# Patient Record
Sex: FEMALE | Race: WHITE | NOT HISPANIC OR LATINO | Employment: OTHER | URBAN - NONMETROPOLITAN AREA
[De-identification: names, ages, dates, MRNs, and addresses within clinical notes are randomized per-mention and may not be internally consistent; named-entity substitution may affect disease eponyms.]

---

## 2022-09-01 ENCOUNTER — OFFICE VISIT (OUTPATIENT)
Dept: OTOLARYNGOLOGY | Facility: CLINIC | Age: 82
End: 2022-09-01

## 2022-09-01 VITALS
RESPIRATION RATE: 16 BRPM | SYSTOLIC BLOOD PRESSURE: 147 MMHG | TEMPERATURE: 98.4 F | BODY MASS INDEX: 26.2 KG/M2 | DIASTOLIC BLOOD PRESSURE: 82 MMHG | HEIGHT: 66 IN | HEART RATE: 80 BPM | WEIGHT: 163 LBS

## 2022-09-01 DIAGNOSIS — H91.93 BILATERAL HEARING LOSS, UNSPECIFIED HEARING LOSS TYPE: ICD-10-CM

## 2022-09-01 DIAGNOSIS — C44.212: Primary | ICD-10-CM

## 2022-09-01 PROCEDURE — 99203 OFFICE O/P NEW LOW 30 MIN: CPT | Performed by: NURSE PRACTITIONER

## 2022-09-01 RX ORDER — ERGOCALCIFEROL (VITAMIN D2) 10 MCG
400 TABLET ORAL DAILY
COMMUNITY

## 2022-09-01 RX ORDER — MULTIVIT WITH MINERALS/LUTEIN
1000 TABLET ORAL DAILY
COMMUNITY

## 2022-09-16 ENCOUNTER — APPOINTMENT (OUTPATIENT)
Dept: CT IMAGING | Facility: HOSPITAL | Age: 82
End: 2022-09-16

## 2022-09-23 ENCOUNTER — HOSPITAL ENCOUNTER (OUTPATIENT)
Dept: CT IMAGING | Facility: HOSPITAL | Age: 82
Discharge: HOME | End: 2022-09-23
Admitting: NURSE PRACTITIONER

## 2022-09-23 PROCEDURE — 25010000002 IOPAMIDOL 61 % SOLUTION: Performed by: NURSE PRACTITIONER

## 2022-09-23 PROCEDURE — 70482 CT ORBIT/EAR/FOSSA W/O&W/DYE: CPT

## 2022-09-23 RX ADMIN — IOPAMIDOL 100 ML: 612 INJECTION, SOLUTION INTRAVENOUS at 14:25

## 2022-09-26 NOTE — PROGRESS NOTES
YOB: 1940  Location: [facility]  Location Address: [address] [place] [address],  3, [address] [place] [address]  Location Phone: [phone]    Chief Complaint   Patient presents with   • Basal Cell Carcinoma     Pt had CT scan done       History of Present Illness  Rosanna Chaparro is a 82 y.o. female.  Rosanna Chaparro is here for follow up of ENT complaints. The patient has had problems with a lesion to the right ear and hearing loss.  The patient has had mild to moderate symptoms. The symptoms have been present for the last year. She had been using vitamin E oil to the ear.    CT Temporal Bones With & Without Contrast (2022 14:25)    Study Result    Narrative & Impression   CT TEMPORAL BONES W WO CONTRAST- 2022 2:04 PM CDT     Indication:  Hearing loss       DOSE LENGTH PRODUCT: 684 mGy cm. Automated exposure control was also  utilized to decrease patient radiation dose.     Technique: Axial CT of the skull base focusing on the temporal bones  with and without IV contrast. Multiplanar reformats are also provided  for review.     Comparison: None     Findings:      Right: Prominent skin thickening and enhancement at the acoustic meatus  which appears to extend anteriorly and inferiorly into the intertragic  fossa. The external canal is completely opacified with debris. No  destructive bony change identified along the margins of the external  canal. Tympanic membrane appears intact. Ossicular chain is intact. Oval  and round window niches are clear. Middle ear cavity is clear including  the Prussak's space. Mastoid antrum and mastoid air cells are clear.  Tegmen is intact. The cochlea and vestibular aqueducts are unremarkable.  Nearly dehiscent superior semicircular canal. Facial nerve canal is  unremarkable.     Left: Debris in the external canal. Tympanic membrane appears grossly  intact. Ossicular chain is intact. Oval and round window niches are  clear. Middle ear cavity is clear  including the Prussak's space. Mastoid  antrum and mastoid air cells are clear. Tegmen is intact. The cochlea  and vestibular aqueducts are unremarkable. Nearly dehiscent superior  semicircular canal. Facial nerve canal is unremarkable.     Visualized intracranial contents are unremarkable. Bilateral high riding  jugular bulbs.     IMPRESSION:  Impression:      1. Abnormal skin thickening and enhancement at the right acoustic  meatus. Recommend clinical correlation/visual inspection in this area.  The right external canal is near completely opacified with soft tissue  debris. I do not see destructive bony change along the external canal.  2. Middle ear cavities and mastoids are clear. Ossicular chains are  intact.  3. Nearly dehiscent superior semicircular canals, bilateral.  This report was finalized on 09/25/2022 18:56 by Dr Alo Reyes, .            History reviewed. No pertinent past medical history.    History reviewed. No pertinent surgical history.    Outpatient Medications Marked as Taking for the 9/27/22 encounter (Office Visit) with Yahir Cao MD   Medication Sig Dispense Refill   • ascorbic acid (VITAMIN C) 1000 MG tablet Take  by mouth.     • CALCIUM PO Take  by mouth.     • CRANBERRY PO Take  by mouth.     • cyanocobalamin (VITAMIN B-12) 1000 MCG tablet Take  by mouth.     • Multiple Vitamins-Minerals (EMERGEN-C IMMUNE PLUS PO) Take  by mouth.     • Vitamin D, Cholecalciferol, (CHOLECALCIFEROL) 10 MCG (400 UNIT) tablet Take 400 Units by mouth Daily.     • vitamin E 100 UNIT capsule Take 100 Units by mouth Daily.         Hydrocodone    History reviewed. No pertinent family history.    Social History     Socioeconomic History   • Marital status:    Tobacco Use   • Smoking status: Never Smoker   • Smokeless tobacco: Never Used   Vaping Use   • Vaping Use: Never used   Substance and Sexual Activity   • Alcohol use: Never   • Drug use: Never       Review of Systems   Constitutional:  Negative.    HENT: Positive for hearing loss.         Admits right ear lesion   Respiratory: Negative.    Cardiovascular: Negative.    Gastrointestinal: Negative.        Vitals:    09/27/22 1352   BP: 167/78   Pulse: 75   Resp: 20   Temp: 97.7 °F (36.5 °C)       Body mass index is 26.63 kg/m².    Objective     Physical Exam  Vitals reviewed.   Constitutional:       Appearance: Normal appearance.   HENT:      Head: Normocephalic and atraumatic.      Right Ear: Decreased hearing noted.      Left Ear: Tympanic membrane, ear canal and external ear normal. Decreased hearing noted.      Ears:        Nose: Nose normal.      Mouth/Throat:      Lips: Pink.      Mouth: Mucous membranes are moist.      Pharynx: Oropharynx is clear. Uvula midline.   Musculoskeletal:      Cervical back: Full passive range of motion without pain.   Neurological:      Mental Status: She is alert.   Psychiatric:         Behavior: Behavior is cooperative.         Assessment & Plan   Diagnoses and all orders for this visit:    1. Basal cell carcinoma (BCC) of jamari of right ear (Primary)  -     Ambulatory Referral to Oncology    2. Bilateral hearing loss, unspecified hearing loss type      * Surgery not found *  Orders Placed This Encounter   Procedures   • Ambulatory Referral to Oncology     Referral Priority:   Routine     Referred to Provider:   Reynaldo Mock MD     Requested Specialty:   Oncology     Number of Visits Requested:   1     Discussed surgical removal options vs medical management with patient. She would like to proceed with referral to Dr. Ha for treatment with Eriveage.    Dr. Cao examined and discussed care with patient and agrees with treatment plan.       Return in about 6 months (around 3/27/2023) for Recheck.       Patient Instructions   Discussed surgical removal options vs medical management with patient. She would like to proceed with referral to Dr. Mock for treatment with Eriveage    CONTACT  INFORMATION:  The main office phone number is [phone]. For emergencies after hours and on weekends, this number will convert over to our answering service and the on call provider will answer. Please try to keep non emergent phone calls/ questions to office hours 9am-5pm Monday through Friday.      [employer]  As an alternative, you can sign up and use the Epic MyChart system for more direct and quicker access for non emergent questions/ problems.  [facility] allows you to send messages to your doctor, view your test results, renew your prescriptions, schedule appointments, and more. To sign up, go to [employer] and click on the Sign Up Now link in the New User? box. Enter your [employer] Activation Code exactly as it appears below along with the last four digits of your Social Security Number and your Date of Birth () to complete the sign-up process. If you do not sign up before the expiration date, you must request a new code.     [employer] Activation Code: Activation code not generated  Current [employer] Status: Active     If you have questions, you can email [email or web address] or call [phone] to talk to our [employer] staff. Remember, [employer] is NOT to be used for urgent needs. For medical emergencies, dial 911.     IF YOU SMOKE OR USE TOBACCO PLEASE READ THE FOLLOWING:  Why is smoking bad for me?  Smoking increases the risk of heart disease, lung disease, vascular disease, stroke, and cancer. If you smoke, STOP!        IF YOU SMOKE OR USE TOBACCO PLEASE READ THE FOLLOWING:  Why is smoking bad for me?  Smoking increases the risk of heart disease, lung disease, vascular disease, stroke, and cancer. If you smoke, STOP!     For more information:  Quit Now [place]  -QUIT-NOW  https://[email or web address].quitlogix.org/en-US/

## 2022-09-27 ENCOUNTER — OFFICE VISIT (OUTPATIENT)
Dept: OTOLARYNGOLOGY | Facility: CLINIC | Age: 82
End: 2022-09-27

## 2022-09-27 VITALS
RESPIRATION RATE: 20 BRPM | TEMPERATURE: 97.7 F | DIASTOLIC BLOOD PRESSURE: 78 MMHG | HEART RATE: 75 BPM | HEIGHT: 66 IN | WEIGHT: 165 LBS | BODY MASS INDEX: 26.52 KG/M2 | SYSTOLIC BLOOD PRESSURE: 167 MMHG

## 2022-09-27 DIAGNOSIS — H91.93 BILATERAL HEARING LOSS, UNSPECIFIED HEARING LOSS TYPE: ICD-10-CM

## 2022-09-27 DIAGNOSIS — C44.212: Primary | ICD-10-CM

## 2022-09-27 PROCEDURE — 99213 OFFICE O/P EST LOW 20 MIN: CPT | Performed by: NURSE PRACTITIONER

## 2022-09-27 NOTE — PATIENT INSTRUCTIONS
Discussed surgical removal options vs medical management with patient. She would like to proceed with referral to Dr. Mock for treatment with Eriveage    CONTACT INFORMATION:  The main office phone number is [phone]. For emergencies after hours and on weekends, this number will convert over to our answering service and the on call provider will answer. Please try to keep non emergent phone calls/ questions to office hours 9am-5pm Monday through Friday.      [employer]  As an alternative, you can sign up and use the Epic MyChart system for more direct and quicker access for non emergent questions/ problems.  [facility] [employer] allows you to send messages to your doctor, view your test results, renew your prescriptions, schedule appointments, and more. To sign up, go to [employer] and click on the Sign Up Now link in the New User? box. Enter your [employer] Activation Code exactly as it appears below along with the last four digits of your Social Security Number and your Date of Birth () to complete the sign-up process. If you do not sign up before the expiration date, you must request a new code.     [employer] Activation Code: Activation code not generated  Current [employer] Status: Active     If you have questions, you can email [email or web address] or call [phone] to talk to our [employer] staff. Remember, [employer] is NOT to be used for urgent needs. For medical emergencies, dial 911.     IF YOU SMOKE OR USE TOBACCO PLEASE READ THE FOLLOWING:  Why is smoking bad for me?  Smoking increases the risk of heart disease, lung disease, vascular disease, stroke, and cancer. If you smoke, STOP!        IF YOU SMOKE OR USE TOBACCO PLEASE READ THE FOLLOWING:  Why is smoking bad for me?  Smoking increases the risk of heart disease, lung disease, vascular disease, stroke, and cancer. If you smoke, STOP!     For more information:  Quit Now  [place]  1-800-QUIT-NOW  https://[place].quitlogix.org/en-US/

## 2022-10-04 ENCOUNTER — TELEPHONE (OUTPATIENT)
Dept: OTOLARYNGOLOGY | Facility: CLINIC | Age: 82
End: 2022-10-04

## 2022-11-03 ENCOUNTER — PROCEDURE VISIT (OUTPATIENT)
Dept: OTOLARYNGOLOGY | Facility: CLINIC | Age: 82
End: 2022-11-03

## 2022-11-03 DIAGNOSIS — D48.5 NEOPLASM OF UNCERTAIN BEHAVIOR OF EAR: Primary | ICD-10-CM

## 2022-11-03 PROCEDURE — 11104 PUNCH BX SKIN SINGLE LESION: CPT | Performed by: OTOLARYNGOLOGY

## 2022-11-03 NOTE — PROGRESS NOTES
PROCEDURE NOTE    Rosanna Chaparro    DATE OF PROCEDURE: 11/03/2022    PROCEDURE:   Punch Biopsy    PREPROCEDURE DIAGNOSIS:   Neoplasm of uncertain origin    POSTPROCEDURE DIAGNOSIS:  SAME      ANESTHESIA:   Injected 1% Lidocaine with 1:100,000 parts epinephrine solution -2 cc    PROCEDURE DESCRIPTION:    Following injection of local anesthesia, 3 mm punch biopsy was performed of the lesion of the right jamari cavum. The epidermis was re-approximated with interrupted 5-0 nylon.  The specimen was submitted for permanent pathologic examination.  The patient tolerated the procedure well with no complications.

## 2022-11-14 ENCOUNTER — OFFICE VISIT (OUTPATIENT)
Dept: OTOLARYNGOLOGY | Facility: CLINIC | Age: 82
End: 2022-11-14

## 2022-11-14 DIAGNOSIS — C44.212: Primary | ICD-10-CM

## 2022-11-14 PROCEDURE — 99024 POSTOP FOLLOW-UP VISIT: CPT | Performed by: OTOLARYNGOLOGY

## 2022-11-14 NOTE — PROGRESS NOTES
Procedure   Suture Removal    Date/Time: 11/14/2022 9:45 AM  Performed by: Ally Andrea RN  Authorized by: Yahir Cao MD   Consent: Verbal consent obtained.  Consent given by: patient  Patient identity confirmed: verbally with patient  Body area: head/neck  Location details: right ear  Comments: Patient presents for suture removal. The incision area is well-approximated with no redness or edema noted. Path discussed.

## 2022-11-21 NOTE — PROGRESS NOTES
[facility] HEMATOLOGY & ONCOLOGY [place] [id number] [facility] [address]  [facility] [address] [phone]    Patient Name: Rosanna Chaparro  Encounter Date: 11/28/2022  YOB: 1940  Patient Number: [id number]      REASON FOR CONSULTATION:   Basal cell carcinoma jamari right ear    HISTORY OF PRESENT ILLNESS: Rosanna Chaparro is an 82-year-old female whose history includes diabetes, and was previously followed by Dr. Beth Richards, [facility] for chronic thrombocytopenia.  Question ITP vs MDS.  Last seen in office by Dr. Richards, 07/08/2017.  At that time platelets have been stable for the past few years without active bleeding.  Platelet count was 23,000,.  To a baseline of 30,000.  No aggressive treatments were pursued.  Previous bone marrow biopsy, 11/05/2012 showed hypercellular marrow showing maturing trilineage hematopoiesis with mild dyserythropoiesis and mild megakaryocytic atypia.  No increase in blasts.  Plan: If platelet continues to worsen drops to 10,000-20,000 or evidence of active bleeding then platelet transfusion can be tried and if this does not work then immunosuppressant such as prednisone will be next treatment option.    --07/29/2022-seen by PCP, Dr. Marvin Vasques.  Patient referred to ENT after right ear exam showed stenotic ear canal with a vestibule showing scaly plaques/mass/neoplasm?    --09/01/2022- seen for ENT by Dr. Cao.  Right ear scabby and draining puslike substance for the past 1 year.  Patient stated that she had a lesion to her right ear for the past year that has progressively gotten larger, and had blistered few days prior before starting to drain.    --09/23/2022- CT temporal bones-1. Abnormal skin thickening and enhancement at the right acoustic meatus. Recommend clinical correlation/visual inspection in this area. The right external canal is near completely opacified with soft tissue  "debris. I do not see destructive bony change along the external canal. 2. Middle ear cavities and mastoids are clear. Ossicular chains are intact.3. Nearly dehiscent superior semicircular canals, bilateral.    -- 11/03/2022- 3 mm punch biopsy lesion from jamari right ear.  Microscopic diagnosis: Basal cell carcinoma-surrounded by scar tissue (shave specimen and superficial skin)    I have reviewed the HPI and verified with the patient the accuracy of it. No changes to interval history since the information was documented. Reynaldo Mock MD 11/28/22       PAST MEDICAL HISTORY:  ALLERGIES:  Allergies   Allergen Reactions   • Hydrocodone Other (See Comments)     Made her \"crazy\"     CURRENT MEDICATIONS:  Outpatient Encounter Medications as of 11/28/2022   Medication Sig Dispense Refill   • ascorbic acid (VITAMIN C) 1000 MG tablet Take  by mouth.     • CALCIUM PO Take  by mouth.     • CRANBERRY PO Take  by mouth.     • cyanocobalamin (VITAMIN B-12) 1000 MCG tablet Take  by mouth.     • Multiple Vitamins-Minerals (EMERGEN-C IMMUNE PLUS PO) Take  by mouth.     • Vitamin D, Cholecalciferol, (CHOLECALCIFEROL) 10 MCG (400 UNIT) tablet Take 400 Units by mouth Daily.     • vitamin E 100 UNIT capsule Take 100 Units by mouth Daily.       No facility-administered encounter medications on file as of 11/28/2022.     Adult illnesses:  Diabetes mellitus  Chronic thrombocytopenia (ITP+/- MDS)  Former smoker  Basal cell carcinoma right ear    Past surgeries:  Laparoscopic inguinal hernia repair  Appendectomy  Cholecystectomy, 2000  Renal lithotripsy x3 for renal stones, most recently 03/28/2018  Orthopedic surgery: Left shoulder fracture repair, 2018 with blood transfusion  Biopsy right ear jamari, 11/3/2022    ADULT ILLNESSES:  Patient Active Problem List   Diagnosis Code   • Neoplasm of uncertain behavior of ear D48.5   • Basal cell carcinoma C44.91     SURGERIES:  No past surgical history on file.  HEALTH MAINTENANCE " "ITEMS:  Health Maintenance Due   Topic Date Due   • DXA SCAN  Never done   • COVID-19 Vaccine (1) Never done   • TDAP/TD VACCINES (1 - Tdap) Never done   • ZOSTER VACCINE (1 of 2) Never done   • Pneumococcal Vaccine 65+ (1 - PCV) Never done   • INFLUENZA VACCINE  Never done   • ANNUAL WELLNESS VISIT  Never done       <no information>  Last Completed Colonoscopy     This patient has no relevant Health Maintenance data.          There is no immunization history on file for this patient.  Last Completed Mammogram     This patient has no relevant Health Maintenance data.            FAMILY HISTORY:  No family history on file.  SOCIAL HISTORY:  Social History     Socioeconomic History   • Marital status:    Tobacco Use   • Smoking status: Never   • Smokeless tobacco: Never   Vaping Use   • Vaping Use: Never used   Substance and Sexual Activity   • Alcohol use: Never   • Drug use: Never       REVIEW OF SYSTEMS:  Review of Systems   Constitutional: Negative.  Negative for fatigue.        Manages her personal ADLs, house chores, but does not run errands on her own and has not been driving. \"I could if my  would let me.\" Is up and about \"most all the time.\" Has a rolling walker at home that she hardly uses.  \"I just have it in case I need to walk far.\"   HENT: Positive for hearing loss (Non-healing lesion in the right ear for over a year.  \"The spot in my ear is starting to blocking the way\").    Eyes: Negative.    Respiratory: Negative.    Cardiovascular: Negative.    Gastrointestinal: Negative.    Endocrine: Negative.    Genitourinary: Negative.    Musculoskeletal: Negative for arthralgias.   Skin: Positive for skin lesions (Right ear canal -non-healing skin lesion).   Allergic/Immunologic: Negative.    Neurological: Negative.    Hematological: Negative.    Psychiatric/Behavioral: Negative.        Resp 18   Ht 167.6 cm (65.98\")   Wt 76.1 kg (167 lb 11.2 oz)   BMI 27.08 kg/m²  Body surface area is 1.86 " meters squared.  There were no vitals filed for this visit.    Physical Exam:  Physical Exam  Vitals reviewed.   Constitutional:       Comments: Pleasant, cooperative, modestly kept, heavy-set elderly female.  Brought in by wheelchair but is otherwise ambulatory.  ECOG 0-1.  She is accompanied by her granddaughter, Keturah Bonner   HENT:      Head: Normocephalic and atraumatic.      Ears:      Comments: stenotic ear canal with acoustic meatus demonstrating small heaps of scaly scabs both anteriorly and posteriorly.  Currently not bleeding nor draining.     Mouth/Throat:      Comments: Is wearing a surgical mask today  Eyes:      General: No scleral icterus.     Extraocular Movements: Extraocular movements intact.      Conjunctiva/sclera: Conjunctivae normal.      Pupils: Pupils are equal, round, and reactive to light.   Cardiovascular:      Rate and Rhythm: Normal rate.   Pulmonary:      Effort: Pulmonary effort is normal.   Abdominal:      Palpations: Abdomen is soft.      Tenderness: There is no abdominal tenderness.   Musculoskeletal:         General: Normal range of motion.      Cervical back: Normal range of motion.   Lymphadenopathy:      Head:      Right side of head: No submental, submandibular, preauricular, posterior auricular or occipital adenopathy.      Left side of head: No submental, submandibular, preauricular, posterior auricular or occipital adenopathy.      Cervical: No cervical adenopathy.      Right cervical: No superficial, deep or posterior cervical adenopathy.     Left cervical: No superficial, deep or posterior cervical adenopathy.      Upper Body:      Right upper body: No supraclavicular or axillary adenopathy.      Left upper body: No supraclavicular or axillary adenopathy.   Skin:     Findings: Lesion present.   Neurological:      General: No focal deficit present.      Mental Status: She is alert and oriented to person, place, and time.   Psychiatric:         Mood and Affect: Mood normal.          Thought Content: Thought content normal.         Assessment:  1.   Basal cell carcinoma of the right ear acoustic meatus  -- High risk of recurrence (lesion on head and neck).  Not felt to be excisional candidate due to locally advanced lesion.  -- Up-to-date algorithm for nonsurgical candidates recommends primary radiation therapy (preferred) or hedgehog pathway inhibitor (preferred) or immune checkpoint inhibitor (cemiplimab).  Clinical follow-up for current every 6-12 months.    2.   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 Beth Richards, [facility] for chronic thrombocytopenia.  Question ITP vs MDS.  Last seen in office by Dr. Richards, 07/08/2017.  At that time platelets have been stable for the past few years without active bleeding.  Platelet count was 23,000,.  To a baseline of 30,000.  No aggressive treatments were pursued.  Previous bone marrow biopsy, 11/05/2012 showed hypercellular marrow showing maturing trilineage hematopoiesis with mild dyserythropoiesis and mild megakaryocytic atypia.  No increase in blasts.      3.   Diabetes mellitus.  4.   Advanced age    Plan:  1.   Apprised of the available diagnostic information.  2.   Draw baseline CBC with differential, iron, iron saturation, ferritin, B12, folate, CMP  3.   Refer to Dr. Taylor Re: Assess for definitive radiation  4.   Teaching sheets for Erivedge (vismodegib).  Potential toxicities discussed (to include but not limited to: Liver injury, azotemia, hyponatremia, hypokalemia, Andres-Gulshan syndrome, toxic epidermal necrolysis, muscle spasms, alopecia, dysgeusia, weight loss, fatigue, nausea, amenorrhea, diarrhea, decreased appetite, constipation, arthralgia, vomiting, loss of taste, elevated CK, azotemia.  5.   Schedule CT head, soft tissues of the neck, chest, abdomen/pelvis with p.o. and IV contrast.    6.   Continue management per PCP and other specialists  7.   Return to office in 4  weeks.  Further management recommendations pending.    I spent 68 minutes caring for  Rosanna on this date of service. This time includes time spent by me in the following activities: preparing for the visit, reviewing tests, performing a medically appropriate examination and/or evaluation, counseling and educating the patient/family/caregiver, ordering medications, tests, or procedures and documenting information in the medical record.

## 2022-11-28 ENCOUNTER — LAB (OUTPATIENT)
Dept: LAB | Facility: HOSPITAL | Age: 82
End: 2022-11-28

## 2022-11-28 ENCOUNTER — CONSULT (OUTPATIENT)
Dept: ONCOLOGY | Facility: CLINIC | Age: 82
End: 2022-11-28

## 2022-11-28 VITALS
DIASTOLIC BLOOD PRESSURE: 72 MMHG | WEIGHT: 167.7 LBS | RESPIRATION RATE: 18 BRPM | HEART RATE: 81 BPM | HEIGHT: 66 IN | BODY MASS INDEX: 26.95 KG/M2 | SYSTOLIC BLOOD PRESSURE: 118 MMHG | OXYGEN SATURATION: 98 % | TEMPERATURE: 98.5 F

## 2022-11-28 DIAGNOSIS — C44.212 BASAL CELL CARCINOMA (BCC) OF SKIN OF RIGHT EAR: ICD-10-CM

## 2022-11-28 DIAGNOSIS — C44.212 BASAL CELL CARCINOMA (BCC) OF SKIN OF RIGHT EAR: Primary | ICD-10-CM

## 2022-11-28 PROBLEM — C44.91 BASAL CELL CARCINOMA: Status: ACTIVE | Noted: 2022-11-28

## 2022-11-28 LAB
ALBUMIN SERPL-MCNC: 4.4 G/DL (ref 3.5–5.2)
ALBUMIN/GLOB SERPL: 1.5 G/DL
ALP SERPL-CCNC: 56 U/L (ref 39–117)
ALT SERPL W P-5'-P-CCNC: 13 U/L (ref 1–33)
ANION GAP SERPL CALCULATED.3IONS-SCNC: 11 MMOL/L (ref 5–15)
AST SERPL-CCNC: 20 U/L (ref 1–32)
BILIRUB SERPL-MCNC: 0.5 MG/DL (ref 0–1.2)
BUN SERPL-MCNC: 21 MG/DL (ref 8–23)
BUN/CREAT SERPL: 28.8 (ref 7–25)
CALCIUM SPEC-SCNC: 9.4 MG/DL (ref 8.6–10.5)
CHLORIDE SERPL-SCNC: 104 MMOL/L (ref 98–107)
CO2 SERPL-SCNC: 27 MMOL/L (ref 22–29)
CREAT SERPL-MCNC: 0.73 MG/DL (ref 0.57–1)
DEPRECATED RDW RBC AUTO: 45.9 FL (ref 37–54)
EGFRCR SERPLBLD CKD-EPI 2021: 82.2 ML/MIN/1.73
ERYTHROCYTE [DISTWIDTH] IN BLOOD BY AUTOMATED COUNT: 13.8 % (ref 12.3–15.4)
FERRITIN SERPL-MCNC: 65.61 NG/ML (ref 13–150)
GIANT PLATELETS: NORMAL
GLOBULIN UR ELPH-MCNC: 2.9 GM/DL
GLUCOSE SERPL-MCNC: 92 MG/DL (ref 65–99)
HCT VFR BLD AUTO: 38.8 % (ref 34–46.6)
HGB BLD-MCNC: 11.9 G/DL (ref 12–15.9)
IRON 24H UR-MRATE: 70 MCG/DL (ref 37–145)
IRON SATN MFR SERPL: 20 % (ref 20–50)
MCH RBC QN AUTO: 27.9 PG (ref 26.6–33)
MCHC RBC AUTO-ENTMCNC: 30.7 G/DL (ref 31.5–35.7)
MCV RBC AUTO: 90.9 FL (ref 79–97)
PLATELET # BLD AUTO: 48 10*3/MM3 (ref 140–450)
PMV BLD AUTO: 11.8 FL (ref 6–12)
POTASSIUM SERPL-SCNC: 4.4 MMOL/L (ref 3.5–5.2)
PROT SERPL-MCNC: 7.3 G/DL (ref 6–8.5)
RBC # BLD AUTO: 4.27 10*6/MM3 (ref 3.77–5.28)
SMALL PLATELETS BLD QL SMEAR: NORMAL
SODIUM SERPL-SCNC: 142 MMOL/L (ref 136–145)
TIBC SERPL-MCNC: 344 MCG/DL (ref 298–536)
TRANSFERRIN SERPL-MCNC: 231 MG/DL (ref 200–360)
WBC NRBC COR # BLD: 8.84 10*3/MM3 (ref 3.4–10.8)

## 2022-11-28 PROCEDURE — 99205 OFFICE O/P NEW HI 60 MIN: CPT | Performed by: INTERNAL MEDICINE

## 2022-11-28 PROCEDURE — 83540 ASSAY OF IRON: CPT

## 2022-11-28 PROCEDURE — 82746 ASSAY OF FOLIC ACID SERUM: CPT

## 2022-11-28 PROCEDURE — 84466 ASSAY OF TRANSFERRIN: CPT

## 2022-11-28 PROCEDURE — 82607 VITAMIN B-12: CPT

## 2022-11-28 PROCEDURE — 80053 COMPREHEN METABOLIC PANEL: CPT

## 2022-11-28 PROCEDURE — 36415 COLL VENOUS BLD VENIPUNCTURE: CPT

## 2022-11-28 PROCEDURE — 82728 ASSAY OF FERRITIN: CPT

## 2022-11-28 PROCEDURE — 85007 BL SMEAR W/DIFF WBC COUNT: CPT

## 2022-11-28 PROCEDURE — 85025 COMPLETE CBC W/AUTO DIFF WBC: CPT

## 2022-11-29 LAB
FOLATE SERPL-MCNC: >20 NG/ML (ref 4.78–24.2)
VIT B12 BLD-MCNC: 1194 PG/ML (ref 211–946)

## 2022-12-05 ENCOUNTER — HOSPITAL ENCOUNTER (OUTPATIENT)
Dept: CT IMAGING | Facility: HOSPITAL | Age: 82
Discharge: HOME | End: 2022-12-05
Admitting: INTERNAL MEDICINE

## 2022-12-05 DIAGNOSIS — C44.212 BASAL CELL CARCINOMA (BCC) OF SKIN OF RIGHT EAR: ICD-10-CM

## 2022-12-05 PROCEDURE — 71260 CT THORAX DX C+: CPT

## 2022-12-05 PROCEDURE — 25010000002 IOPAMIDOL 61 % SOLUTION: Performed by: INTERNAL MEDICINE

## 2022-12-05 PROCEDURE — 70470 CT HEAD/BRAIN W/O & W/DYE: CPT

## 2022-12-05 PROCEDURE — 74177 CT ABD & PELVIS W/CONTRAST: CPT

## 2022-12-05 PROCEDURE — 70491 CT SOFT TISSUE NECK W/DYE: CPT

## 2022-12-05 RX ADMIN — IOPAMIDOL 100 ML: 612 INJECTION, SOLUTION INTRAVENOUS at 15:53

## 2022-12-06 ENCOUNTER — TELEPHONE (OUTPATIENT)
Dept: ONCOLOGY | Facility: CLINIC | Age: 82
End: 2022-12-06

## 2022-12-06 NOTE — TELEPHONE ENCOUNTER
----- Message from Reynaldo Mock MD sent at 12/5/2022  7:41 PM CST -----  Regarding: referral to dr bonilla  Pls confirm that pt has a referral to dr bonilla or dr sr of [facility]  ----- Message -----  From: Interface, Rad Results [place] In  Sent: 12/5/2022   5:53 PM CST  To: Reynaldo Mock MD

## 2022-12-08 PROBLEM — Z78.9 NON-SMOKER: Status: ACTIVE | Noted: 2022-12-08

## 2022-12-09 ENCOUNTER — CONSULT (OUTPATIENT)
Dept: RADIATION ONCOLOGY | Facility: HOSPITAL | Age: 82
End: 2022-12-09

## 2022-12-09 ENCOUNTER — HOSPITAL ENCOUNTER (OUTPATIENT)
Dept: RADIATION ONCOLOGY | Facility: HOSPITAL | Age: 82
Setting detail: RADIATION/ONCOLOGY SERIES
End: 2022-12-09
Payer: MEDICARE

## 2022-12-09 VITALS
WEIGHT: 165 LBS | SYSTOLIC BLOOD PRESSURE: 92 MMHG | BODY MASS INDEX: 26.52 KG/M2 | HEIGHT: 66 IN | DIASTOLIC BLOOD PRESSURE: 50 MMHG

## 2022-12-09 DIAGNOSIS — C44.211 BASAL CELL CARCINOMA (BCC) OF SKIN OF EAR, UNSPECIFIED LATERALITY: Primary | ICD-10-CM

## 2022-12-09 DIAGNOSIS — Z78.9 NON-SMOKER: ICD-10-CM

## 2022-12-09 PROCEDURE — G0463 HOSPITAL OUTPT CLINIC VISIT: HCPCS | Performed by: RADIOLOGY

## 2022-12-09 NOTE — PROGRESS NOTES
[facility] IsraelPRATIBHA  ________________________________________  [facility]  Department of Radiation Oncology  [address] [place] [id number]  Office:  [phone]  Fax: [phone]    DATE:  12/09/2022  PATIENT:  Rosanna Chaparro 1940                 MEDICAL RECORD #:  [id number]                                                       REASON FOR VISIT  Chief Complaint   Patient presents with   • Basal Cell Carcinoma     Right ear     Rosanna Chaparro is a very pleasant female that has been referred to our office for radiotherapy considerations.     History of Present Illness:  09/01/2022 - Appointment with Timmy Nunes APRN - Otolaryngology:  • She complains of a lesionright ear present for 1 year(s)  • Patient states she has had a lesion to her right ear for the past year that has progressively gotten larger. She states she had a blistered area a couple days ago that started draining.   Plan:  • ct temporal bones prior to follow up   • Follow up in 4 weeks and we will discuss excision of lesion   • Follow up in about 4 weeks (around 9/29/2022).     09/23/2022 - CT Temporal Bones due to hearing loss:  • Abnormal skin thickening and enhancement at the right acoustic meatus. Recommend clinical correlation/visual inspection in this area. The right external canal is near completely opacified with soft tissue debris. I do not see destructive bony change along the external canal.  • Middle ear cavities and mastoids are clear. Ossicular chains are intact.  • Nearly dehiscent superior semicircular canals, bilateral.    09/27/2022 - Appointment with Timmy Nunes APRN - Otolaryngology:  • Discussed surgical removal options vs medical management with patient. She would like to proceed with referral to Dr. Ha for treatment with Eriveage.  • Dr. Cao examined and discussed care with patient and agrees with treatment plan.    • Return in about 6 months (around 3/27/2023) for Recheck.    10/04/2022 - Documentation per Otolaryngology group:  • Patient needs biospy of lesion per Dr. 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 High risk of recurrence (lesion on head and neck).  Not felt to be excisional candidate due to locally advanced lesion.  ? Up-to-date algorithm for nonsurgical candidates recommends primary radiation therapy (preferred) or hedgehog pathway inhibitor (preferred) or immune checkpoint inhibitor (cemiplimab).  Clinical follow-up for current every 6-12 months.  • History of thrombocytopenia (ITP versus MDS)  o --Previously followed by Dr. Beth Richards, [facility] for chronic thrombocytopenia.  Question ITP vs MDS.  Last seen in office by Dr. Richards, 07/08/2017.  At that time platelets have been stable for the past few years without active bleeding.  Platelet count was 23,000,.  To a baseline of 30,000.  No aggressive treatments were pursued.  Previous bone marrow biopsy, 11/05/2012 showed hypercellular marrow showing maturing trilineage hematopoiesis with mild dyserythropoiesis and mild megakaryocytic atypia.  No increase in blasts.    • Diabetes mellitus.  • Advanced age  Plan:  • Apprised of the available diagnostic information.  • Draw baseline CBC with differential, iron, iron saturation, ferritin, B12, folate, CMP  • Refer to Dr. Taylor Re: Assess for definitive radiation  • Teaching sheets for Erivedge (vismodegib).  Potential toxicities discussed (to include but not limited to: Liver injury, azotemia, hyponatremia, hypokalemia, Andres-Gulshan syndrome, toxic epidermal necrolysis, muscle spasms, alopecia, dysgeusia, weight  loss, fatigue, nausea, amenorrhea, diarrhea, decreased appetite, constipation, arthralgia, vomiting, loss of taste, elevated CK, azotemia.  • Schedule CT head, soft tissues of the neck, chest, abdomen/pelvis with p.o. and IV contrast.    • Continue management per PCP and other specialists  • Return to office in 4 weeks.  Further management recommendations pending.    12/05/2022 - CT Soft Tissue Neck with contrast:  • 2.0 x 1.8 x 1.2 cm enhancing skin lesion involving the jamari of the right ear. .   • There is also a 7 mm depth of invasion inferUp to 1.1 cm depth of invasion with the deep margin essentially abutting the bony elements of the external auditory canal along the roof and floor of the external canaliorly, with the lesion abutting the capsule of the superficial lobe of the right parotid gland.   • There is a 7 mm hyperenhancing lymph node in the upper level 2B neck. This is not technically enlarged by size criteria but this does appear to be hyperenhancing relative to the other cervical nodes and I'm suspicious that this is a leyda metastasis.    12/05/2022 - CT Chest with contrast:  • Rather benign-appearing subpleural pulmonary nodule in the right lower lobe which could be followed up on subsequent imaging. There is mild basilar atelectasis as well as linear scarring or atelectasis in the left upper lobe. No evidence of consolidative pneumonia.  • Heavy coronary calcifications are present. There is mild cardiomegaly. The thoracic aorta and great vessels are ectatic.  • No convincing evidence of metastatic disease to the thorax.  • Hiatal hernia. There is a cortical cyst of the upper pole of the left kidney.    12/05/2022 - CT Abdomen/Pelvis with contrast:  • No radiographic evidence of metastatic disease to the abdomen or pelvis.  • Multiple compression deformities in the thoracic and lumbar spine. The fracture at L3 is more acute to subacute in appearance and should be correlated clinically. There is an  accentuated thoracic kyphosis with mild gibbus deformity with the thoracolumbar juncture.  • Cortical cyst upper pole left kidney. No evidence of nephrolithiasis or obstructive uropathy.  • Moderate hiatal hernia.  • Heavy atheromatous calcification of the abdominal aorta and iliac arteries with no evidence of aneurysm.    12/05/2022 - CT Head with and without contrast:  • Age-appropriate atrophy.    12/06/2022 -Documentation per Mock:  • Pls confirm that pt has a referral to dr bonilla or dr sr of [facility]    Patient comes into consultation accompanied by her granddaughter and offers no spontaneous complaints.  She confirms the right ear lesion has been present for over a year and clogs the ear canal causing decreased hearing.  She denies pain or bleeding at this time.  She denies knowledge of nodules, masses, headaches, dizziness, change in vision or speech, or focal neurological deficits.    History obtained from  PATIENT and FAMILY    PAST MEDICAL HISTORY  Past Medical History:   Diagnosis Date   • Basal cell carcinoma (BCC) of jamari of right ear    • Idiopathic thrombocytopenic purpura (ITP) (HCC)    • Kidney stones       PAST SURGICAL HISTORY  Past Surgical History:   Procedure Laterality Date   • APPENDECTOMY     • CHOLECYSTECTOMY     • SHOULDER SURGERY Left       PAST ONCOLOGIC HISTORY: Patient denies previous history of malignancies, chemotherapy or radiation therapy.  She denies knowledge of vascular/connective tissue disorders or active rheumatologic disease.    FAMILY HISTORY  family history is not on file.    SOCIAL HISTORY  Social History     Tobacco Use   • Smoking status: Never   • Smokeless tobacco: Never   Vaping Use   • Vaping Use: Never used   Substance Use Topics   • Alcohol use: Never   • Drug use: Never     ALLERGIES  Tramadol      MEDICATIONS    Current Outpatient Medications:   •  ascorbic acid (VITAMIN C) 1000 MG tablet, Take 1,000 mg by mouth Daily., Disp: , Rfl:   •  CALCIUM PO,  "Take 1 tablet by mouth Daily., Disp: , Rfl:   •  CRANBERRY PO, Take 1 tablet by mouth Daily., Disp: , Rfl:   •  Multiple Vitamins-Minerals (EMERGEN-C IMMUNE PLUS PO), Take 1 tablet by mouth Daily., Disp: , Rfl:   •  Vitamin D, Cholecalciferol, (CHOLECALCIFEROL) 10 MCG (400 UNIT) tablet, Take 400 Units by mouth Daily., Disp: , Rfl:   •  cyanocobalamin (VITAMIN B-12) 1000 MCG tablet, Take  by mouth., Disp: , Rfl:     Current outpatient and discharge medications have been reconciled for the patient.  Reviewed by: Kike Rhodes MD    The following portions of the patient's history were reviewed and updated as appropriate: allergies, current medications, past family history, past medical history, past social history, past surgical history and problem list.    REVIEW OF SYSTEMS  Review of Systems   Constitutional: Negative.    HENT:   Positive for hearing loss.         Right ear decreased hearing.   Eyes: Negative.    Respiratory: Negative.    Cardiovascular: Negative.    Gastrointestinal: Negative.    Endocrine: Negative.    Genitourinary: Negative.     Musculoskeletal: Negative.    Skin: Negative.         Right ear skin cancer lesion occasionally causes tickling.   Neurological: Negative.    Hematological: Negative.    Psychiatric/Behavioral: Negative.        I have reviewed and confirmed the accuracy of the ROS as documented by the MA/LPN/RN Kike Rhodes MD     PHYSICAL EXAM  Vital Signs:   Vitals:    12/09/22 1055   BP: 92/50   Weight: 74.8 kg (165 lb)   Height: 167.6 cm (66\")   PainSc: 0-No pain      Physical Exam  Constitutional:       Appearance: Normal appearance. She is normal weight.   HENT:      Head: Normocephalic.      Comments: Mild erythema surrounding entrance of right auditory canal, where a pink fleshy lesion is noted obstructing the auditory canal without tenderness to manipulation of the ear.  No gross cartilaginous changes noted.     Left Ear: Tympanic membrane, ear canal and external ear " normal.      Nose: Nose normal.      Mouth/Throat:      Mouth: Mucous membranes are moist.      Pharynx: Oropharynx is clear.   Eyes:      Extraocular Movements: Extraocular movements intact.      Conjunctiva/sclera: Conjunctivae normal.      Pupils: Pupils are equal, round, and reactive to light.   Neck:      Comments: There were no preauricular, postauricular, facial, submental, submandibular, or bilateral cervical/supraclavicular/axillary lymphadenopathy palpated.  Cardiovascular:      Rate and Rhythm: Normal rate.      Pulses: Normal pulses.   Pulmonary:      Effort: Pulmonary effort is normal.   Abdominal:      General: Abdomen is flat.      Palpations: Abdomen is soft.   Musculoskeletal:         General: Normal range of motion.      Cervical back: Normal range of motion and neck supple.   Skin:     General: Skin is warm.   Neurological:      General: No focal deficit present.      Mental Status: She is alert and oriented to person, place, and time. Mental status is at baseline.   Psychiatric:         Mood and Affect: Mood normal.         Behavior: Behavior normal.         Thought Content: Thought content normal.           Performance Status: ECOG (0) Fully active, able to carry on all predisease performance without restriction    Clinical Quality Measures  -Pain Documented by Standardized Tool, FPS  Rosanna J Shankar reports a pain score of 0. Given her pain assessment as noted, treatment options were discussed and the following options were decided upon as a follow-up plan to address the patient's pain: No pain,no plan given.     -Advanced Care Planning   Advance Care Planning   ACP discussion was held with the patient during this visit. Patient does not have an advance directive, information provided.     -Body Mass Index Screening and Follow-Up Plan BMI is >= 25 and <30. (Overweight) The following options were offered after discussion;: none (medical contraindication)    -Tobacco Use: Screening and  Cessation Intervention Social History    Tobacco Use      Smoking status: Never      Smokeless tobacco: Never    ASSESSMENT AND PLAN  1. 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 Non-smoker      No orders of the defined types were placed in this encounter.     RECOMMENDATIONS: Rosanna CEDILLO Chatom has been referred to our office today to discuss radiotherapy recommendations for locally advanced high risk basal cell skin cancer of the right ear acoustic meatus obstructing the distal auditory canal.  Tumor burden includes a 2 cm tumor present at the acoustic meatus causing decreased unilateral hearing with up to 1.1 cm depth of invasion with the deep margin essentially abutting the bony elements of the external auditory canal along the roof and floor of the external canaliorly, with the lesion abutting the capsule of the superficial lobe of the right parotid gland. There is a 7 mm hyperenhancing lymph node in the upper level 2B neck.  Per radiology this is not technically enlarged by size criteria but this does appear to be hyperenhancing relative to the other cervical nodes and I'm suspicious that this is a leyda metastasis, thus T1N0 MX versus T1N1MX presentation.    Patient is considering treatment options that have been offered to her including surgical resection and systemic therapy with vismodegib.  She presents today for consultation regarding radiotherapy options.    We have discussed the indications and rationale of radiation therapy according to the NCCN Guidelines.  Given a prominent ipsilateral neck lymph node that measures 7 mm with hyperintensity on CT scan, I discussed possibility of obtaining a biopsy or proceeding with PET scan to rule out regional leyda spread.  I do believe definitive intent radiotherapy will be a good option for her but she is also considering surgical and systemic therapy options.  I have extensively reviewed the risks, benefits and alternatives of  therapy and progression of disease in spite of therapy with either local or systemic failure.   I have seen, examined and reviewed her medication list, appropriate labs and imaging studies as well as other physician notes. We discussed the goals/plans of care and answered all questions.     In summary, 3 options were presented to her which included needle biopsy of a prominent ipsilateral neck lymph node, which of positive may lend itself to additional treatment recommendations, versus PET scan staging, versus proceeding with definitive intent radiotherapy to include primary lesion and suspicious ipsilateral lymphatic region.  I explained the intent, benefits, course, precautions, and side effects of treatment which include worsening skin reactions including erythema and skin desquamation, radiation-induced hearing changes, radiation-induced salivary gland dysfunction which can lead to xerostomia, patchy alopecia, fatigue and possibly ipsilateral oropharyngeal mucositis.  Their questions were answered to their satisfaction and at the end of our discussion patient and her granddaughter wished to discuss the totality of treatment options presented to them with her family and call back with a decision.    If patient wishes to proceed with definitive intent radiotherapy, anticipate treating to a dose of 7000 cGy to be given over 35 daily fractions (Monday through Friday).  We will await patient call back with a decision.  In the meantime, she was asked to follow-up with her other healthcare providers for routine medical care as per their scheduling.    Thank you for allowing me to assist in this patients care.    There are no Patient Instructions on file for this visit.    Time Spent: I spent 60 minutes caring for Rosanna on this date of service. This time includes time spent by me in the following activities: preparing for the visit, reviewing tests, obtaining and/or reviewing a separately obtained history, performing  a medically appropriate examination and/or evaluation, counseling and educating the patient/family/caregiver, ordering medications, tests, or procedures and referring and communicating with other health care professionals and chart documentation.  Kike Rhodes MD   12/09/2022

## 2022-12-14 ENCOUNTER — HOSPITAL ENCOUNTER (OUTPATIENT)
Dept: RADIATION ONCOLOGY | Facility: HOSPITAL | Age: 82
Setting detail: RADIATION/ONCOLOGY SERIES
Discharge: HOME | End: 2022-12-14
Payer: MEDICARE

## 2022-12-14 ENCOUNTER — DOCUMENTATION (OUTPATIENT)
Dept: RADIATION ONCOLOGY | Facility: HOSPITAL | Age: 82
End: 2022-12-14

## 2022-12-14 PROCEDURE — 77290 THER RAD SIMULAJ FIELD CPLX: CPT | Performed by: RADIOLOGY

## 2022-12-14 PROCEDURE — 77334 RADIATION TREATMENT AID(S): CPT | Performed by: RADIOLOGY

## 2022-12-14 NOTE — PROGRESS NOTES
Patient called back to schedule CT simulation wishing to move forward with definitive intent radiotherapy as previously discussed.  They declined to pursue additional work-up by way of PET scan or lymph node biopsy wanting to only pursue treatment at this time.    PLAN:  We will proceed with CT simulation today in anticipation of definitive intent radiotherapy to a dose of 7000 cGy to be given over 35 daily fractions and will make efforts to include the ipsilateral prominent lymph node previously discussed..

## 2022-12-19 PROCEDURE — 77300 RADIATION THERAPY DOSE PLAN: CPT | Performed by: RADIOLOGY

## 2022-12-19 PROCEDURE — 77338 DESIGN MLC DEVICE FOR IMRT: CPT | Performed by: RADIOLOGY

## 2022-12-19 PROCEDURE — 77301 RADIOTHERAPY DOSE PLAN IMRT: CPT | Performed by: RADIOLOGY

## 2022-12-22 PROCEDURE — 77301 RADIOTHERAPY DOSE PLAN IMRT: CPT | Performed by: RADIOLOGY

## 2022-12-22 PROCEDURE — 77300 RADIATION THERAPY DOSE PLAN: CPT | Performed by: RADIOLOGY

## 2022-12-22 PROCEDURE — 77338 DESIGN MLC DEVICE FOR IMRT: CPT | Performed by: RADIOLOGY

## 2022-12-28 NOTE — PROGRESS NOTES
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  She is seen to discuss the diagnostic information gathered since her initial visit and for subsequent management planning.  She is accompanied by her granddaughter, Keturah Bonner      I have reviewed the HPI and verified with the patient the accuracy of it. No changes to interval history since the information was documented. Reynaldo Mock MD 01/04/23     Diagnostic abnormalities:  --History includes diabetes, and was previously followed by Dr. Beth Richards, [facility] for chronic thrombocytopenia.  Question ITP vs MDS.  Last seen in office by Dr. Richards, 07/08/2017.  At that time platelets have been stable for the past few years without active bleeding.  Platelet count was 23,000,.  To a baseline of 30,000.  No aggressive treatments were pursued.  Previous bone marrow biopsy, 11/05/2012 showed hypercellular marrow showing maturing trilineage hematopoiesis with mild dyserythropoiesis and mild megakaryocytic atypia.  No increase in blasts.  Plan: If platelet continues to worsen drops to 10,000-20,000 or evidence of active bleeding then platelet transfusion can be tried and if this does not work then immunosuppressant such as prednisone will be next treatment option.  --07/29/2022-seen by PCP, Dr. Marvin Vasques.  Patient referred to ENT after right ear exam showed stenotic ear canal with a vestibule showing scaly plaques/mass/neoplasm?  --09/01/2022- seen for ENT by Dr. Cao.  Right ear scabby and draining puslike substance for the past 1 year.  Patient stated that she had a  lesion to her right ear for the past year that has progressively gotten larger, and had blistered few days prior before starting to drain.  --09/23/2022- CT temporal bones-1. Abnormal skin thickening and enhancement at the right acoustic meatus. Recommend clinical correlation/visual inspection in this area. The right external canal is near completely opacified with soft tissue debris. I do not see destructive bony change along the external canal. 2. Middle ear cavities and mastoids are clear. Ossicular chains are intact.3. Nearly dehiscent superior semicircular canals, bilateral.  -- 11/03/2022- 3 mm punch biopsy lesion from jamari right ear.  Microscopic diagnosis: Basal cell carcinoma-surrounded by scar tissue (shave specimen and superficial skin)  --11/28/2022 -CMP normal.  B12 1194, folate>20, ferritin 65, iron, iron saturation 20%, hemoglobin 11.9, hematocrit 38.8, MCV 90.9, WBC 8.84, platelets 48,000  -- 12/05/2022 - CT head- 1. Age-appropriate atrophy. 2. Otherwise normal CT of the brain with and without contrast.  -- 12/05/2022- CT soft tissue neck-  1. 2.0 x 1.8 x 1.2 cm enhancing skin lesion involving the jamari of the right ear. Up to 1.1 cm depth of invasion with the deep margin essentially abutting the bony elements of the external auditory canal along the roof and floor of the external canal. There is also a 7 mm depth of invasion inferiorly, with the lesion abutting the capsule of the superficial lobe of the right parotid gland. There is a 7 mm hyperenhancing lymph node in the upper level 2B neck. This is not technically enlarged by size criteria but this does appear to be hyperenhancing relative to the other cervical nodes and I'm suspicious that this is a leyda metastasis.  -- 12/05/2022- CT chest- 1. Rather benign-appearing subpleural pulmonary nodule in the right lower lobe which could be followed up on subsequent imaging. There is mild basilar atelectasis as well as linear scarring or atelectasis  in the left upper lobe. No evidence of consolidative pneumonia. 2. Heavy coronary calcifications are present. There is mild cardiomegaly. The thoracic aorta and great vessels are ectatic. 3. No convincing evidence of metastatic disease to the thorax. 4. Hiatal hernia. There is a cortical cyst of the upper pole of the left kidney.  -- 12/05/2022 - CT abdomen/pelvis- 1. No radiographic evidence of metastatic disease to the abdomen or pelvis. 2. Multiple compression deformities in the thoracic and lumbar spine. The fracture at L3 is more acute to subacute in appearance and should be correlated clinically. There is an accentuated thoracic kyphosis with mild gibbus deformity with the thoracolumbar juncture. 3. Cortical cyst upper pole left kidney. No evidence of nephrolithiasis or obstructive uropathy. 4. Moderate hiatal hernia. 5. Heavy atheromatous calcification of the abdominal aorta and iliac arteries with no evidence of aneurysm.  --12/09/2022- Consult-Dr. Rhodes of radiation oncology- RECOMMENDATIONS: Rosanna Chaparro has been referred to our office today to discuss radiotherapy recommendations for locally advanced high risk basal cell skin cancer of the right ear acoustic meatus obstructing the distal auditory canal.  Tumor burden includes a 2 cm tumor present at the acoustic meatus causing decreased unilatera hearing with up to 1.1 cm depth of invasion with the deep margin essentially abutting the bony elements of the external auditory canal along the roof and floor of the external canaliorly, with the lesion abutting the capsule of the superficial lobe of the right parotid gland. There is a 7 mm hyperenhancing lymph node in the upper level 2B neck.  Per radiology this is not technically enlarged by size criteria but this does appear to be hyperenhancing relative to the other cervical nodes and I'm suspicious that this is a leyda metastasis, thus T1N0 MX versus T1N1MX presentation.     Patient is considering  treatment options that have been offered to her including surgical resection and systemic therapy with vismodegib.  She presents today for consultation regarding radiotherapy options.     We have discussed the indications and rationale of radiation therapy according to the NCCN Guidelines.  Given a prominent ipsilateral neck lymph node that measures 7 mm with hyperintensity on CT scan, I discussed possibility of obtaining a biopsy or proceeding with PET scan to rule out regional leyda spread.  I do believe definitive intent radiotherapy will be a good option for her but she is also considering surgical and systemic therapy options.  I have extensively reviewed the risks, benefits and alternatives of therapy and progression of disease in spite of therapy with either local or systemic failure.   I have seen, examined and reviewed her medication list, appropriate labs and imaging studies as well as other physician notes. We discussed the goals/plans of care and answered all questions.      In summary, 3 options were presented to her which included needle biopsy of a prominent ipsilateral neck lymph node, which of positive may lend itself to additional treatment recommendations, versus PET scan staging, versus proceeding with definitive intent radiotherapy to include primary lesion and suspicious ipsilateral lymphatic region.  I explained the intent, benefits, course, precautions, and side effects of treatment which include worsening skin reactions including erythema and skin desquamation, radiation-induced hearing changes, radiation-induced salivary gland dysfunction which can lead to xerostomia, patchy alopecia, fatigue and possibly ipsilateral oropharyngeal mucositis.  Their questions were answered to their satisfaction and at the end of our discussion patient and her granddaughter wished to discuss the totality of treatment options presented to them with her family and call back with a decision.  If patient wishes  to proceed with definitive intent radiotherapy, anticipate treating to a dose of 7000 cGy to be given over 35 daily fractions (Monday through Friday).  We will await patient call back with a decision.    Previous interventions:  -- pending definitive intent RT    PAST MEDICAL HISTORY:  ALLERGIES:  Allergies   Allergen Reactions   • Tramadol Other (See Comments)     Made her \"crazy\"     CURRENT MEDICATIONS:  Outpatient Encounter Medications as of 1/4/2023   Medication Sig Dispense Refill   • ascorbic acid (VITAMIN C) 1000 MG tablet Take 1,000 mg by mouth Daily.     • CALCIUM PO Take 1 tablet by mouth Daily.     • CRANBERRY PO Take 1 tablet by mouth Daily.     • Multiple Vitamins-Minerals (EMERGEN-C IMMUNE PLUS PO) Take 1 tablet by mouth Daily.     • Vitamin D, Cholecalciferol, (CHOLECALCIFEROL) 10 MCG (400 UNIT) tablet Take 400 Units by mouth Daily.     • [DISCONTINUED] cyanocobalamin (VITAMIN B-12) 1000 MCG tablet Take  by mouth.       No facility-administered encounter medications on file as of 1/4/2023.     Adult illnesses:  Diabetes mellitus  Chronic thrombocytopenia (ITP+/- MDS)  Former smoker  Basal cell carcinoma right ear    Past surgeries:  Laparoscopic inguinal hernia repair  Appendectomy  Cholecystectomy, 2000  Renal lithotripsy x3 for renal stones, most recently 03/28/2018  Orthopedic surgery: Left shoulder fracture repair, 2018 with blood transfusion  Biopsy right ear jamari, 11/3/2022    ADULT ILLNESSES:  Patient Active Problem List   Diagnosis Code   • Neoplasm of uncertain behavior of ear D48.5   • Basal cell carcinoma C44.91   • Non-smoker Z78.9   • Thrombocytopenia (HCC) D69.6     SURGERIES:  Past Surgical History:   Procedure Laterality Date   • APPENDECTOMY     • CHOLECYSTECTOMY     • SHOULDER SURGERY Left      HEALTH MAINTENANCE ITEMS:  Health Maintenance Due   Topic Date Due   • URINE MICROALBUMIN  Never done   • DXA SCAN  Never done   • COVID-19 Vaccine (1) Never done   • Pneumococcal Vaccine  65+ (1 - PCV) Never done   • TDAP/TD VACCINES (1 - Tdap) Never done   • ZOSTER VACCINE (1 of 2) Never done   • INFLUENZA VACCINE  Never done   • ANNUAL WELLNESS VISIT  Never done   • HEMOGLOBIN A1C  Never done   • DIABETIC EYE EXAM  Never done       <no information>  Last Completed Colonoscopy     This patient has no relevant Health Maintenance data.          There is no immunization history on file for this patient.  Last Completed Mammogram     This patient has no relevant Health Maintenance data.            FAMILY HISTORY:  History reviewed. No pertinent family history.  SOCIAL HISTORY:  Social History     Socioeconomic History   • Marital status:    Tobacco Use   • Smoking status: Never   • Smokeless tobacco: Never   Vaping Use   • Vaping Use: Never used   Substance and Sexual Activity   • Alcohol use: Never   • Drug use: Never       REVIEW OF SYSTEMS:  Review of Systems   Constitutional: Negative.  Negative for fatigue.        Manages her personal ADLs, house chores, but does not run errands on her own and has not been driving. \"I could if my  would let me.\" Is up and about \"most all the time.\" Has a rolling walker at home that she hardly uses.  \"I just have it in case I need to walk far.\"   HENT: Positive for hearing loss (Non-healing lesion in the right ear for over a year.  \"The spot in my ear is starting to blocking the way\").    Eyes: Negative.    Respiratory: Negative.    Cardiovascular: Negative.    Gastrointestinal: Negative.    Endocrine: Negative.    Genitourinary: Negative.    Musculoskeletal: Negative for arthralgias.   Skin: Positive for skin lesions (Right ear canal -non-healing skin lesion).   Allergic/Immunologic: Negative.    Neurological: Negative.    Hematological: Negative.    Psychiatric/Behavioral: Negative.        /68   Pulse 69   Temp 98.1 °F (36.7 °C)   Resp 18   Ht 167.6 cm (66\")   Wt 74.5 kg (164 lb 4.8 oz)   LMP  (LMP Unknown)   SpO2 99%   BMI 26.52 kg/m²   Body surface area is 1.84 meters squared.  Pain Score    01/04/23 0930   PainSc: 0-No pain       Physical Exam:  Physical Exam  Vitals reviewed.   Constitutional:       Comments: Pleasant, cooperative, modestly kept, heavy-set elderly female.  Brought in by wheelchair but is otherwise ambulatory.  ECOG 0-1.      Has lost 3 lb since the initial visit   HENT:      Head: Normocephalic and atraumatic.      Ears:      Comments: Again note stenotic ear canal with acoustic meatus again notable for scaly scabs both anteriorly and posteriorly.  Currently without bleeding nor drainage.     Mouth/Throat:      Comments: Is wearing a surgical mask today  Eyes:      General: No scleral icterus.     Extraocular Movements: Extraocular movements intact.      Conjunctiva/sclera: Conjunctivae normal.      Pupils: Pupils are equal, round, and reactive to light.   Cardiovascular:      Rate and Rhythm: Normal rate.   Pulmonary:      Effort: Pulmonary effort is normal.   Abdominal:      Palpations: Abdomen is soft.      Tenderness: There is no abdominal tenderness.   Musculoskeletal:         General: Normal range of motion.      Cervical back: Normal range of motion.   Lymphadenopathy:      Head:      Right side of head: No submental, submandibular, preauricular, posterior auricular or occipital adenopathy.      Left side of head: No submental, submandibular, preauricular, posterior auricular or occipital adenopathy.      Cervical: No cervical adenopathy.      Right cervical: No superficial, deep or posterior cervical adenopathy.     Left cervical: No superficial, deep or posterior cervical adenopathy.      Upper Body:      Right upper body: No supraclavicular or axillary adenopathy.      Left upper body: No supraclavicular or axillary adenopathy.   Skin:     Findings: Lesion present.   Neurological:      General: No focal deficit present.      Mental Status: She is alert and oriented to person, place, and time.   Psychiatric:          Mood and Affect: Mood normal.         Thought Content: Thought content normal.         Assessment:  1.   Basal cell carcinoma of the right ear acoustic meatus  -- Tumor stage: cT1, cN0 vs cT1, cN1  -- Tumor burden/complications:  2 cm tumor present at the acoustic meatus causing decreased unilateral hearing with up to 1.1 cm depth of invasion with the deep margin essentially abutting the bony elements of the external auditory canal along the roof and floor of the external canaliorly, with the lesion abutting the capsule of the superficial lobe of the right parotid gland. There is a 7 mm hyperenhancing lymph node in the upper level 2B neck.  Per radiology this is not technically enlarged by size criteria but this does appear to be hyperenhancing relative to the other cervical nodes suspicious for leyda metastasis  -- High risk of recurrence (lesion on head and neck).  Not felt to be excisional candidate due to locally advanced lesion.  -- Up-to-date algorithm for nonsurgical candidates recommends primary radiation therapy (preferred) or hedgehog pathway inhibitor (preferred) or immune checkpoint inhibitor (cemiplimab).  Clinical follow-up for recurrence every 6-12 months.    2.   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 Beth Richards, [facility] for chronic thrombocytopenia.  Question ITP vs MDS.  Last seen in office by Dr. Richards, 07/08/2017.  At that time platelets have been stable for the past few years without active bleeding.  Platelet count was 23,000,.  To a baseline of 30,000.  No aggressive treatments were pursued.  Previous bone marrow biopsy, 11/05/2012 showed hypercellular marrow showing maturing trilineage hematopoiesis with mild dyserythropoiesis and mild megakaryocytic atypia.  No increase in blasts.    --11/28/2022- platelets 48,000 (ramon: 23,000 per records)    3.   Diabetes mellitus.  4.   Advanced age    Plan:  1.   Apprised of the baseline labs,  including normal CMP, repleted B12/folate, borderline iron levels, normal WBC, Hgb 11.9, platelets 48,000.  Also note staging CT scans, 12/05/2022 (above).  No overt metastatic disease  2.   Review consult, 12/09 per Dr. Rhodes (see above). Anticipate treating to a dose of 7000 cGy to be given over 35 daily fractions (Monday through Friday).  We will await patient call back with a decision  3.   Erivedge (vismodegib).  Potential toxicities again discussed (to include but not limited to: Liver injury, azotemia, hyponatremia, hypokalemia, Andres-Gulsahn syndrome, toxic epidermal necrolysis, muscle spasms, alopecia, dysgeusia, weight loss, fatigue, nausea, amenorrhea, diarrhea, decreased appetite, constipation, arthralgia, vomiting, loss of taste, elevated CK, azotemia.    4.   Schedule whole body PET scan.  5.   Discussed that if PET scan does not show metastatic disease and if disease can be encompassed by definitive RT, given patient's advanced age and comorbidities, will consider withholding systemic therapy until she develops more widespread \"measurable disease\".  On the other hand, if PET scan shows metastatic disease, will consider systemic therapy up-front    6.   Continue management per PCP and other specialists  7.   Return to office in 6 weeks.      I spent 68 minutes caring for  Rosanna on this date of service. This time includes time spent by me in the following activities: preparing for the visit, reviewing tests, performing a medically appropriate examination and/or evaluation, counseling and educating the patient/family/caregiver, ordering medications, tests, or procedures and documenting information in the medical record.

## 2023-01-02 ENCOUNTER — HOSPITAL ENCOUNTER (OUTPATIENT)
Dept: RADIATION ONCOLOGY | Facility: HOSPITAL | Age: 83
Setting detail: RADIATION/ONCOLOGY SERIES
End: 2023-01-02
Payer: MEDICARE

## 2023-01-03 ENCOUNTER — HOSPITAL ENCOUNTER (OUTPATIENT)
Dept: RADIATION ONCOLOGY | Facility: HOSPITAL | Age: 83
Setting detail: RADIATION/ONCOLOGY SERIES
Discharge: HOME | End: 2023-01-03
Payer: MEDICARE

## 2023-01-04 ENCOUNTER — OFFICE VISIT (OUTPATIENT)
Dept: ONCOLOGY | Facility: CLINIC | Age: 83
End: 2023-01-04
Payer: MEDICARE

## 2023-01-04 VITALS
BODY MASS INDEX: 26.41 KG/M2 | HEART RATE: 69 BPM | DIASTOLIC BLOOD PRESSURE: 68 MMHG | HEIGHT: 66 IN | SYSTOLIC BLOOD PRESSURE: 110 MMHG | OXYGEN SATURATION: 99 % | WEIGHT: 164.3 LBS | RESPIRATION RATE: 18 BRPM | TEMPERATURE: 98.1 F

## 2023-01-04 DIAGNOSIS — R93.0 ABNORMAL FINDINGS ON DIAGNOSTIC IMAGING OF SKULL AND HEAD, NOT ELSEWHERE CLASSIFIED: ICD-10-CM

## 2023-01-04 DIAGNOSIS — C44.211 BASAL CELL CARCINOMA (BCC) OF SKIN OF EAR, UNSPECIFIED LATERALITY: Primary | ICD-10-CM

## 2023-01-04 PROCEDURE — 77301 RADIOTHERAPY DOSE PLAN IMRT: CPT | Performed by: RADIOLOGY

## 2023-01-04 PROCEDURE — 1126F AMNT PAIN NOTED NONE PRSNT: CPT | Performed by: INTERNAL MEDICINE

## 2023-01-04 PROCEDURE — 77338 DESIGN MLC DEVICE FOR IMRT: CPT | Performed by: RADIOLOGY

## 2023-01-04 PROCEDURE — 1159F MED LIST DOCD IN RCRD: CPT | Performed by: INTERNAL MEDICINE

## 2023-01-04 PROCEDURE — 77300 RADIATION THERAPY DOSE PLAN: CPT | Performed by: RADIOLOGY

## 2023-01-04 PROCEDURE — 99215 OFFICE O/P EST HI 40 MIN: CPT | Performed by: INTERNAL MEDICINE

## 2023-01-04 PROCEDURE — 1160F RVW MEDS BY RX/DR IN RCRD: CPT | Performed by: INTERNAL MEDICINE

## 2023-01-11 ENCOUNTER — HOSPITAL ENCOUNTER (OUTPATIENT)
Dept: RADIATION ONCOLOGY | Facility: HOSPITAL | Age: 83
Setting detail: RADIATION/ONCOLOGY SERIES
Discharge: HOME | End: 2023-01-11
Payer: MEDICARE

## 2023-01-11 ENCOUNTER — DOCUMENTATION (OUTPATIENT)
Dept: RADIATION ONCOLOGY | Facility: HOSPITAL | Age: 83
End: 2023-01-11
Payer: MEDICARE

## 2023-01-11 LAB
RAD ONC ARIA COURSE ID: NORMAL
RAD ONC ARIA COURSE LAST TREATMENT DATE: NORMAL
RAD ONC ARIA COURSE START DATE: NORMAL
RAD ONC ARIA COURSE TREATMENT ELAPSED DAYS: 0
RAD ONC ARIA FIRST TREATMENT DATE: NORMAL
RAD ONC ARIA PLAN FRACTIONS TREATED TO DATE: 1
RAD ONC ARIA PLAN ID: NORMAL
RAD ONC ARIA PLAN PRESCRIBED DOSE PER FRACTION: 2 GY
RAD ONC ARIA PLAN PRIMARY REFERENCE POINT: NORMAL
RAD ONC ARIA PLAN TOTAL FRACTIONS PRESCRIBED: 23
RAD ONC ARIA PLAN TOTAL PRESCRIBED DOSE: 4600 CGY
RAD ONC ARIA REFERENCE POINT DOSAGE GIVEN TO DATE: 0.6 GY
RAD ONC ARIA REFERENCE POINT DOSAGE GIVEN TO DATE: 2 GY
RAD ONC ARIA REFERENCE POINT ID: NORMAL
RAD ONC ARIA REFERENCE POINT ID: NORMAL
RAD ONC ARIA REFERENCE POINT SESSION DOSAGE GIVEN: 0.6 GY
RAD ONC ARIA REFERENCE POINT SESSION DOSAGE GIVEN: 2 GY

## 2023-01-11 PROCEDURE — 77386: CPT | Performed by: RADIOLOGY

## 2023-01-11 NOTE — PROGRESS NOTES
ADAM met with Mrs. Chaparro’s granddaughter. Mrs. Chaparro is starting radiation treatment today for basal cell carcinoma of skin of ear. ADAM introduced self and explained role and source of support. She is 82 years old and lives with her spouse, daughter, son, granddaughter and spouse, and great grandchildren. She has a strong support system. At this time she does not have any transportation or financial concerns. She was an LPN for 35 years. She stays active with doing housework. She was hesitant at first about radiation due to loved ones in the past receiving treatment and their experiences. Family will call if assistance is needed in the future.

## 2023-01-12 ENCOUNTER — HOSPITAL ENCOUNTER (OUTPATIENT)
Dept: RADIATION ONCOLOGY | Facility: HOSPITAL | Age: 83
Setting detail: RADIATION/ONCOLOGY SERIES
Discharge: HOME | End: 2023-01-12
Payer: MEDICARE

## 2023-01-12 LAB
RAD ONC ARIA COURSE ID: NORMAL
RAD ONC ARIA COURSE LAST TREATMENT DATE: NORMAL
RAD ONC ARIA COURSE START DATE: NORMAL
RAD ONC ARIA COURSE TREATMENT ELAPSED DAYS: 1
RAD ONC ARIA FIRST TREATMENT DATE: NORMAL
RAD ONC ARIA PLAN FRACTIONS TREATED TO DATE: 2
RAD ONC ARIA PLAN ID: NORMAL
RAD ONC ARIA PLAN PRESCRIBED DOSE PER FRACTION: 2 GY
RAD ONC ARIA PLAN PRIMARY REFERENCE POINT: NORMAL
RAD ONC ARIA PLAN TOTAL FRACTIONS PRESCRIBED: 23
RAD ONC ARIA PLAN TOTAL PRESCRIBED DOSE: 4600 CGY
RAD ONC ARIA REFERENCE POINT DOSAGE GIVEN TO DATE: 1.21 GY
RAD ONC ARIA REFERENCE POINT DOSAGE GIVEN TO DATE: 4 GY
RAD ONC ARIA REFERENCE POINT ID: NORMAL
RAD ONC ARIA REFERENCE POINT ID: NORMAL
RAD ONC ARIA REFERENCE POINT SESSION DOSAGE GIVEN: 0.6 GY
RAD ONC ARIA REFERENCE POINT SESSION DOSAGE GIVEN: 2 GY

## 2023-01-12 PROCEDURE — 77386: CPT | Performed by: RADIOLOGY

## 2023-01-13 ENCOUNTER — HOSPITAL ENCOUNTER (OUTPATIENT)
Dept: CT IMAGING | Facility: HOSPITAL | Age: 83
Discharge: HOME | End: 2023-01-13
Payer: MEDICARE

## 2023-01-13 ENCOUNTER — HOSPITAL ENCOUNTER (OUTPATIENT)
Dept: RADIATION ONCOLOGY | Facility: HOSPITAL | Age: 83
Setting detail: RADIATION/ONCOLOGY SERIES
Discharge: HOME | End: 2023-01-13
Payer: MEDICARE

## 2023-01-13 DIAGNOSIS — C44.211 BASAL CELL CARCINOMA (BCC) OF SKIN OF EAR, UNSPECIFIED LATERALITY: ICD-10-CM

## 2023-01-13 DIAGNOSIS — R93.0 ABNORMAL FINDINGS ON DIAGNOSTIC IMAGING OF SKULL AND HEAD, NOT ELSEWHERE CLASSIFIED: ICD-10-CM

## 2023-01-13 LAB
RAD ONC ARIA COURSE ID: NORMAL
RAD ONC ARIA COURSE LAST TREATMENT DATE: NORMAL
RAD ONC ARIA COURSE START DATE: NORMAL
RAD ONC ARIA COURSE TREATMENT ELAPSED DAYS: 2
RAD ONC ARIA FIRST TREATMENT DATE: NORMAL
RAD ONC ARIA PLAN FRACTIONS TREATED TO DATE: 3
RAD ONC ARIA PLAN ID: NORMAL
RAD ONC ARIA PLAN PRESCRIBED DOSE PER FRACTION: 2 GY
RAD ONC ARIA PLAN PRIMARY REFERENCE POINT: NORMAL
RAD ONC ARIA PLAN TOTAL FRACTIONS PRESCRIBED: 23
RAD ONC ARIA PLAN TOTAL PRESCRIBED DOSE: 4600 CGY
RAD ONC ARIA REFERENCE POINT DOSAGE GIVEN TO DATE: 1.81 GY
RAD ONC ARIA REFERENCE POINT DOSAGE GIVEN TO DATE: 6 GY
RAD ONC ARIA REFERENCE POINT ID: NORMAL
RAD ONC ARIA REFERENCE POINT ID: NORMAL
RAD ONC ARIA REFERENCE POINT SESSION DOSAGE GIVEN: 0.6 GY
RAD ONC ARIA REFERENCE POINT SESSION DOSAGE GIVEN: 2 GY

## 2023-01-13 PROCEDURE — A9552 F18 FDG: HCPCS | Performed by: INTERNAL MEDICINE

## 2023-01-13 PROCEDURE — 77386: CPT | Performed by: RADIOLOGY

## 2023-01-13 PROCEDURE — 0 FLUDEOXYGLUCOSE F18 SOLUTION: Performed by: INTERNAL MEDICINE

## 2023-01-13 PROCEDURE — 78816 PET IMAGE W/CT FULL BODY: CPT

## 2023-01-13 RX ADMIN — FLUDEOXYGLUCOSE F18 1 DOSE: 300 INJECTION INTRAVENOUS at 09:45

## 2023-01-16 ENCOUNTER — HOSPITAL ENCOUNTER (OUTPATIENT)
Dept: RADIATION ONCOLOGY | Facility: HOSPITAL | Age: 83
Setting detail: RADIATION/ONCOLOGY SERIES
Discharge: HOME | End: 2023-01-16
Payer: MEDICARE

## 2023-01-16 LAB
RAD ONC ARIA COURSE ID: NORMAL
RAD ONC ARIA COURSE LAST TREATMENT DATE: NORMAL
RAD ONC ARIA COURSE START DATE: NORMAL
RAD ONC ARIA COURSE TREATMENT ELAPSED DAYS: 5
RAD ONC ARIA FIRST TREATMENT DATE: NORMAL
RAD ONC ARIA PLAN FRACTIONS TREATED TO DATE: 4
RAD ONC ARIA PLAN ID: NORMAL
RAD ONC ARIA PLAN PRESCRIBED DOSE PER FRACTION: 2 GY
RAD ONC ARIA PLAN PRIMARY REFERENCE POINT: NORMAL
RAD ONC ARIA PLAN TOTAL FRACTIONS PRESCRIBED: 23
RAD ONC ARIA PLAN TOTAL PRESCRIBED DOSE: 4600 CGY
RAD ONC ARIA REFERENCE POINT DOSAGE GIVEN TO DATE: 2.42 GY
RAD ONC ARIA REFERENCE POINT DOSAGE GIVEN TO DATE: 8 GY
RAD ONC ARIA REFERENCE POINT ID: NORMAL
RAD ONC ARIA REFERENCE POINT ID: NORMAL
RAD ONC ARIA REFERENCE POINT SESSION DOSAGE GIVEN: 0.6 GY
RAD ONC ARIA REFERENCE POINT SESSION DOSAGE GIVEN: 2 GY

## 2023-01-16 PROCEDURE — 77386: CPT | Performed by: RADIOLOGY

## 2023-01-17 ENCOUNTER — HOSPITAL ENCOUNTER (OUTPATIENT)
Dept: RADIATION ONCOLOGY | Facility: HOSPITAL | Age: 83
Discharge: HOME | End: 2023-01-17

## 2023-01-17 LAB
RAD ONC ARIA COURSE ID: NORMAL
RAD ONC ARIA COURSE LAST TREATMENT DATE: NORMAL
RAD ONC ARIA COURSE START DATE: NORMAL
RAD ONC ARIA COURSE TREATMENT ELAPSED DAYS: 6
RAD ONC ARIA FIRST TREATMENT DATE: NORMAL
RAD ONC ARIA PLAN FRACTIONS TREATED TO DATE: 5
RAD ONC ARIA PLAN ID: NORMAL
RAD ONC ARIA PLAN PRESCRIBED DOSE PER FRACTION: 2 GY
RAD ONC ARIA PLAN PRIMARY REFERENCE POINT: NORMAL
RAD ONC ARIA PLAN TOTAL FRACTIONS PRESCRIBED: 23
RAD ONC ARIA PLAN TOTAL PRESCRIBED DOSE: 4600 CGY
RAD ONC ARIA REFERENCE POINT DOSAGE GIVEN TO DATE: 10 GY
RAD ONC ARIA REFERENCE POINT DOSAGE GIVEN TO DATE: 3.02 GY
RAD ONC ARIA REFERENCE POINT ID: NORMAL
RAD ONC ARIA REFERENCE POINT ID: NORMAL
RAD ONC ARIA REFERENCE POINT SESSION DOSAGE GIVEN: 0.6 GY
RAD ONC ARIA REFERENCE POINT SESSION DOSAGE GIVEN: 2 GY

## 2023-01-17 PROCEDURE — 77386: CPT | Performed by: RADIOLOGY

## 2023-01-18 ENCOUNTER — HOSPITAL ENCOUNTER (OUTPATIENT)
Dept: RADIATION ONCOLOGY | Facility: HOSPITAL | Age: 83
Setting detail: RADIATION/ONCOLOGY SERIES
Discharge: HOME | End: 2023-01-18
Payer: MEDICARE

## 2023-01-18 LAB
RAD ONC ARIA COURSE ID: NORMAL
RAD ONC ARIA COURSE LAST TREATMENT DATE: NORMAL
RAD ONC ARIA COURSE START DATE: NORMAL
RAD ONC ARIA COURSE TREATMENT ELAPSED DAYS: 7
RAD ONC ARIA FIRST TREATMENT DATE: NORMAL
RAD ONC ARIA PLAN FRACTIONS TREATED TO DATE: 6
RAD ONC ARIA PLAN ID: NORMAL
RAD ONC ARIA PLAN PRESCRIBED DOSE PER FRACTION: 2 GY
RAD ONC ARIA PLAN PRIMARY REFERENCE POINT: NORMAL
RAD ONC ARIA PLAN TOTAL FRACTIONS PRESCRIBED: 23
RAD ONC ARIA PLAN TOTAL PRESCRIBED DOSE: 4600 CGY
RAD ONC ARIA REFERENCE POINT DOSAGE GIVEN TO DATE: 12 GY
RAD ONC ARIA REFERENCE POINT DOSAGE GIVEN TO DATE: 3.63 GY
RAD ONC ARIA REFERENCE POINT ID: NORMAL
RAD ONC ARIA REFERENCE POINT ID: NORMAL
RAD ONC ARIA REFERENCE POINT SESSION DOSAGE GIVEN: 0.6 GY
RAD ONC ARIA REFERENCE POINT SESSION DOSAGE GIVEN: 2 GY

## 2023-01-18 PROCEDURE — 77386: CPT | Performed by: RADIOLOGY

## 2023-01-19 ENCOUNTER — HOSPITAL ENCOUNTER (OUTPATIENT)
Dept: RADIATION ONCOLOGY | Facility: HOSPITAL | Age: 83
Setting detail: RADIATION/ONCOLOGY SERIES
Discharge: HOME | End: 2023-01-19
Payer: MEDICARE

## 2023-01-19 LAB
RAD ONC ARIA COURSE ID: NORMAL
RAD ONC ARIA COURSE LAST TREATMENT DATE: NORMAL
RAD ONC ARIA COURSE START DATE: NORMAL
RAD ONC ARIA COURSE TREATMENT ELAPSED DAYS: 8
RAD ONC ARIA FIRST TREATMENT DATE: NORMAL
RAD ONC ARIA PLAN FRACTIONS TREATED TO DATE: 7
RAD ONC ARIA PLAN ID: NORMAL
RAD ONC ARIA PLAN PRESCRIBED DOSE PER FRACTION: 2 GY
RAD ONC ARIA PLAN PRIMARY REFERENCE POINT: NORMAL
RAD ONC ARIA PLAN TOTAL FRACTIONS PRESCRIBED: 23
RAD ONC ARIA PLAN TOTAL PRESCRIBED DOSE: 4600 CGY
RAD ONC ARIA REFERENCE POINT DOSAGE GIVEN TO DATE: 14 GY
RAD ONC ARIA REFERENCE POINT DOSAGE GIVEN TO DATE: 4.23 GY
RAD ONC ARIA REFERENCE POINT ID: NORMAL
RAD ONC ARIA REFERENCE POINT ID: NORMAL
RAD ONC ARIA REFERENCE POINT SESSION DOSAGE GIVEN: 0.6 GY
RAD ONC ARIA REFERENCE POINT SESSION DOSAGE GIVEN: 2 GY

## 2023-01-19 PROCEDURE — 77386: CPT | Performed by: RADIOLOGY

## 2023-01-19 PROCEDURE — 77336 RADIATION PHYSICS CONSULT: CPT | Performed by: RADIOLOGY

## 2023-01-20 ENCOUNTER — HOSPITAL ENCOUNTER (OUTPATIENT)
Dept: RADIATION ONCOLOGY | Facility: HOSPITAL | Age: 83
Setting detail: RADIATION/ONCOLOGY SERIES
Discharge: HOME | End: 2023-01-20
Payer: MEDICARE

## 2023-01-20 LAB
RAD ONC ARIA COURSE ID: NORMAL
RAD ONC ARIA COURSE LAST TREATMENT DATE: NORMAL
RAD ONC ARIA COURSE START DATE: NORMAL
RAD ONC ARIA COURSE TREATMENT ELAPSED DAYS: 9
RAD ONC ARIA FIRST TREATMENT DATE: NORMAL
RAD ONC ARIA PLAN FRACTIONS TREATED TO DATE: 8
RAD ONC ARIA PLAN ID: NORMAL
RAD ONC ARIA PLAN PRESCRIBED DOSE PER FRACTION: 2 GY
RAD ONC ARIA PLAN PRIMARY REFERENCE POINT: NORMAL
RAD ONC ARIA PLAN TOTAL FRACTIONS PRESCRIBED: 23
RAD ONC ARIA PLAN TOTAL PRESCRIBED DOSE: 4600 CGY
RAD ONC ARIA REFERENCE POINT DOSAGE GIVEN TO DATE: 16 GY
RAD ONC ARIA REFERENCE POINT DOSAGE GIVEN TO DATE: 4.84 GY
RAD ONC ARIA REFERENCE POINT ID: NORMAL
RAD ONC ARIA REFERENCE POINT ID: NORMAL
RAD ONC ARIA REFERENCE POINT SESSION DOSAGE GIVEN: 0.6 GY
RAD ONC ARIA REFERENCE POINT SESSION DOSAGE GIVEN: 2 GY

## 2023-01-20 PROCEDURE — 77386: CPT | Performed by: RADIOLOGY

## 2023-01-23 ENCOUNTER — HOSPITAL ENCOUNTER (OUTPATIENT)
Dept: RADIATION ONCOLOGY | Facility: HOSPITAL | Age: 83
Setting detail: RADIATION/ONCOLOGY SERIES
Discharge: HOME | End: 2023-01-23
Payer: MEDICARE

## 2023-01-23 LAB
RAD ONC ARIA COURSE ID: NORMAL
RAD ONC ARIA COURSE LAST TREATMENT DATE: NORMAL
RAD ONC ARIA COURSE START DATE: NORMAL
RAD ONC ARIA COURSE TREATMENT ELAPSED DAYS: 12
RAD ONC ARIA FIRST TREATMENT DATE: NORMAL
RAD ONC ARIA PLAN FRACTIONS TREATED TO DATE: 9
RAD ONC ARIA PLAN ID: NORMAL
RAD ONC ARIA PLAN PRESCRIBED DOSE PER FRACTION: 2 GY
RAD ONC ARIA PLAN PRIMARY REFERENCE POINT: NORMAL
RAD ONC ARIA PLAN TOTAL FRACTIONS PRESCRIBED: 23
RAD ONC ARIA PLAN TOTAL PRESCRIBED DOSE: 4600 CGY
RAD ONC ARIA REFERENCE POINT DOSAGE GIVEN TO DATE: 18 GY
RAD ONC ARIA REFERENCE POINT DOSAGE GIVEN TO DATE: 5.44 GY
RAD ONC ARIA REFERENCE POINT ID: NORMAL
RAD ONC ARIA REFERENCE POINT ID: NORMAL
RAD ONC ARIA REFERENCE POINT SESSION DOSAGE GIVEN: 0.6 GY
RAD ONC ARIA REFERENCE POINT SESSION DOSAGE GIVEN: 2 GY

## 2023-01-23 PROCEDURE — 77386: CPT | Performed by: RADIOLOGY

## 2023-01-24 ENCOUNTER — HOSPITAL ENCOUNTER (OUTPATIENT)
Dept: RADIATION ONCOLOGY | Facility: HOSPITAL | Age: 83
Setting detail: RADIATION/ONCOLOGY SERIES
Discharge: HOME | End: 2023-01-24
Payer: MEDICARE

## 2023-01-24 LAB
RAD ONC ARIA COURSE ID: NORMAL
RAD ONC ARIA COURSE LAST TREATMENT DATE: NORMAL
RAD ONC ARIA COURSE START DATE: NORMAL
RAD ONC ARIA COURSE TREATMENT ELAPSED DAYS: 13
RAD ONC ARIA FIRST TREATMENT DATE: NORMAL
RAD ONC ARIA PLAN FRACTIONS TREATED TO DATE: 10
RAD ONC ARIA PLAN ID: NORMAL
RAD ONC ARIA PLAN PRESCRIBED DOSE PER FRACTION: 2 GY
RAD ONC ARIA PLAN PRIMARY REFERENCE POINT: NORMAL
RAD ONC ARIA PLAN TOTAL FRACTIONS PRESCRIBED: 23
RAD ONC ARIA PLAN TOTAL PRESCRIBED DOSE: 4600 CGY
RAD ONC ARIA REFERENCE POINT DOSAGE GIVEN TO DATE: 20 GY
RAD ONC ARIA REFERENCE POINT DOSAGE GIVEN TO DATE: 6.05 GY
RAD ONC ARIA REFERENCE POINT ID: NORMAL
RAD ONC ARIA REFERENCE POINT ID: NORMAL
RAD ONC ARIA REFERENCE POINT SESSION DOSAGE GIVEN: 0.6 GY
RAD ONC ARIA REFERENCE POINT SESSION DOSAGE GIVEN: 2 GY

## 2023-01-24 PROCEDURE — 77386: CPT | Performed by: RADIOLOGY

## 2023-01-25 ENCOUNTER — HOSPITAL ENCOUNTER (OUTPATIENT)
Dept: RADIATION ONCOLOGY | Facility: HOSPITAL | Age: 83
Setting detail: RADIATION/ONCOLOGY SERIES
Discharge: HOME | End: 2023-01-25
Payer: MEDICARE

## 2023-01-25 LAB
RAD ONC ARIA COURSE ID: NORMAL
RAD ONC ARIA COURSE LAST TREATMENT DATE: NORMAL
RAD ONC ARIA COURSE START DATE: NORMAL
RAD ONC ARIA COURSE TREATMENT ELAPSED DAYS: 14
RAD ONC ARIA FIRST TREATMENT DATE: NORMAL
RAD ONC ARIA PLAN FRACTIONS TREATED TO DATE: 11
RAD ONC ARIA PLAN ID: NORMAL
RAD ONC ARIA PLAN PRESCRIBED DOSE PER FRACTION: 2 GY
RAD ONC ARIA PLAN PRIMARY REFERENCE POINT: NORMAL
RAD ONC ARIA PLAN TOTAL FRACTIONS PRESCRIBED: 23
RAD ONC ARIA PLAN TOTAL PRESCRIBED DOSE: 4600 CGY
RAD ONC ARIA REFERENCE POINT DOSAGE GIVEN TO DATE: 22 GY
RAD ONC ARIA REFERENCE POINT DOSAGE GIVEN TO DATE: 6.65 GY
RAD ONC ARIA REFERENCE POINT ID: NORMAL
RAD ONC ARIA REFERENCE POINT ID: NORMAL
RAD ONC ARIA REFERENCE POINT SESSION DOSAGE GIVEN: 0.6 GY
RAD ONC ARIA REFERENCE POINT SESSION DOSAGE GIVEN: 2 GY

## 2023-01-25 PROCEDURE — 77386: CPT | Performed by: RADIOLOGY

## 2023-01-26 ENCOUNTER — HOSPITAL ENCOUNTER (OUTPATIENT)
Dept: RADIATION ONCOLOGY | Facility: HOSPITAL | Age: 83
Setting detail: RADIATION/ONCOLOGY SERIES
Discharge: HOME | End: 2023-01-26
Payer: MEDICARE

## 2023-01-26 LAB
RAD ONC ARIA COURSE ID: NORMAL
RAD ONC ARIA COURSE LAST TREATMENT DATE: NORMAL
RAD ONC ARIA COURSE START DATE: NORMAL
RAD ONC ARIA COURSE TREATMENT ELAPSED DAYS: 15
RAD ONC ARIA FIRST TREATMENT DATE: NORMAL
RAD ONC ARIA PLAN FRACTIONS TREATED TO DATE: 12
RAD ONC ARIA PLAN ID: NORMAL
RAD ONC ARIA PLAN PRESCRIBED DOSE PER FRACTION: 2 GY
RAD ONC ARIA PLAN PRIMARY REFERENCE POINT: NORMAL
RAD ONC ARIA PLAN TOTAL FRACTIONS PRESCRIBED: 23
RAD ONC ARIA PLAN TOTAL PRESCRIBED DOSE: 4600 CGY
RAD ONC ARIA REFERENCE POINT DOSAGE GIVEN TO DATE: 24 GY
RAD ONC ARIA REFERENCE POINT DOSAGE GIVEN TO DATE: 7.26 GY
RAD ONC ARIA REFERENCE POINT ID: NORMAL
RAD ONC ARIA REFERENCE POINT ID: NORMAL
RAD ONC ARIA REFERENCE POINT SESSION DOSAGE GIVEN: 0.6 GY
RAD ONC ARIA REFERENCE POINT SESSION DOSAGE GIVEN: 2 GY

## 2023-01-26 PROCEDURE — 77386: CPT | Performed by: RADIOLOGY

## 2023-01-26 PROCEDURE — 77336 RADIATION PHYSICS CONSULT: CPT | Performed by: RADIOLOGY

## 2023-01-27 ENCOUNTER — HOSPITAL ENCOUNTER (OUTPATIENT)
Dept: RADIATION ONCOLOGY | Facility: HOSPITAL | Age: 83
Setting detail: RADIATION/ONCOLOGY SERIES
Discharge: HOME | End: 2023-01-27
Payer: MEDICARE

## 2023-01-27 LAB
RAD ONC ARIA COURSE ID: NORMAL
RAD ONC ARIA COURSE LAST TREATMENT DATE: NORMAL
RAD ONC ARIA COURSE START DATE: NORMAL
RAD ONC ARIA COURSE TREATMENT ELAPSED DAYS: 16
RAD ONC ARIA FIRST TREATMENT DATE: NORMAL
RAD ONC ARIA PLAN FRACTIONS TREATED TO DATE: 13
RAD ONC ARIA PLAN ID: NORMAL
RAD ONC ARIA PLAN PRESCRIBED DOSE PER FRACTION: 2 GY
RAD ONC ARIA PLAN PRIMARY REFERENCE POINT: NORMAL
RAD ONC ARIA PLAN TOTAL FRACTIONS PRESCRIBED: 23
RAD ONC ARIA PLAN TOTAL PRESCRIBED DOSE: 4600 CGY
RAD ONC ARIA REFERENCE POINT DOSAGE GIVEN TO DATE: 26 GY
RAD ONC ARIA REFERENCE POINT DOSAGE GIVEN TO DATE: 7.86 GY
RAD ONC ARIA REFERENCE POINT ID: NORMAL
RAD ONC ARIA REFERENCE POINT ID: NORMAL
RAD ONC ARIA REFERENCE POINT SESSION DOSAGE GIVEN: 0.6 GY
RAD ONC ARIA REFERENCE POINT SESSION DOSAGE GIVEN: 2 GY

## 2023-01-27 PROCEDURE — 77386: CPT | Performed by: RADIOLOGY

## 2023-01-30 ENCOUNTER — HOSPITAL ENCOUNTER (OUTPATIENT)
Dept: RADIATION ONCOLOGY | Facility: HOSPITAL | Age: 83
Setting detail: RADIATION/ONCOLOGY SERIES
Discharge: HOME | End: 2023-01-30
Payer: MEDICARE

## 2023-01-30 LAB
RAD ONC ARIA COURSE ID: NORMAL
RAD ONC ARIA COURSE LAST TREATMENT DATE: NORMAL
RAD ONC ARIA COURSE START DATE: NORMAL
RAD ONC ARIA COURSE TREATMENT ELAPSED DAYS: 19
RAD ONC ARIA FIRST TREATMENT DATE: NORMAL
RAD ONC ARIA PLAN FRACTIONS TREATED TO DATE: 14
RAD ONC ARIA PLAN ID: NORMAL
RAD ONC ARIA PLAN PRESCRIBED DOSE PER FRACTION: 2 GY
RAD ONC ARIA PLAN PRIMARY REFERENCE POINT: NORMAL
RAD ONC ARIA PLAN TOTAL FRACTIONS PRESCRIBED: 23
RAD ONC ARIA PLAN TOTAL PRESCRIBED DOSE: 4600 CGY
RAD ONC ARIA REFERENCE POINT DOSAGE GIVEN TO DATE: 28 GY
RAD ONC ARIA REFERENCE POINT DOSAGE GIVEN TO DATE: 8.47 GY
RAD ONC ARIA REFERENCE POINT ID: NORMAL
RAD ONC ARIA REFERENCE POINT ID: NORMAL
RAD ONC ARIA REFERENCE POINT SESSION DOSAGE GIVEN: 0.6 GY
RAD ONC ARIA REFERENCE POINT SESSION DOSAGE GIVEN: 2 GY

## 2023-01-30 PROCEDURE — 77386: CPT | Performed by: RADIOLOGY

## 2023-02-01 ENCOUNTER — HOSPITAL ENCOUNTER (OUTPATIENT)
Dept: RADIATION ONCOLOGY | Facility: HOSPITAL | Age: 83
Setting detail: RADIATION/ONCOLOGY SERIES
End: 2023-02-01
Payer: MEDICARE

## 2023-02-02 PROCEDURE — 77336 RADIATION PHYSICS CONSULT: CPT | Performed by: RADIOLOGY

## 2023-02-03 ENCOUNTER — HOSPITAL ENCOUNTER (OUTPATIENT)
Dept: RADIATION ONCOLOGY | Facility: HOSPITAL | Age: 83
Setting detail: RADIATION/ONCOLOGY SERIES
Discharge: HOME | End: 2023-02-03
Payer: MEDICARE

## 2023-02-03 LAB
RAD ONC ARIA COURSE ID: NORMAL
RAD ONC ARIA COURSE LAST TREATMENT DATE: NORMAL
RAD ONC ARIA COURSE START DATE: NORMAL
RAD ONC ARIA COURSE TREATMENT ELAPSED DAYS: 23
RAD ONC ARIA FIRST TREATMENT DATE: NORMAL
RAD ONC ARIA PLAN FRACTIONS TREATED TO DATE: 15
RAD ONC ARIA PLAN ID: NORMAL
RAD ONC ARIA PLAN PRESCRIBED DOSE PER FRACTION: 2 GY
RAD ONC ARIA PLAN PRIMARY REFERENCE POINT: NORMAL
RAD ONC ARIA PLAN TOTAL FRACTIONS PRESCRIBED: 23
RAD ONC ARIA PLAN TOTAL PRESCRIBED DOSE: 4600 CGY
RAD ONC ARIA REFERENCE POINT DOSAGE GIVEN TO DATE: 30 GY
RAD ONC ARIA REFERENCE POINT DOSAGE GIVEN TO DATE: 9.07 GY
RAD ONC ARIA REFERENCE POINT ID: NORMAL
RAD ONC ARIA REFERENCE POINT ID: NORMAL
RAD ONC ARIA REFERENCE POINT SESSION DOSAGE GIVEN: 0.6 GY
RAD ONC ARIA REFERENCE POINT SESSION DOSAGE GIVEN: 2 GY

## 2023-02-03 PROCEDURE — 77386: CPT | Performed by: RADIOLOGY

## 2023-02-06 ENCOUNTER — HOSPITAL ENCOUNTER (OUTPATIENT)
Dept: RADIATION ONCOLOGY | Facility: HOSPITAL | Age: 83
Setting detail: RADIATION/ONCOLOGY SERIES
Discharge: HOME | End: 2023-02-06
Payer: MEDICARE

## 2023-02-06 LAB
RAD ONC ARIA COURSE ID: NORMAL
RAD ONC ARIA COURSE LAST TREATMENT DATE: NORMAL
RAD ONC ARIA COURSE START DATE: NORMAL
RAD ONC ARIA COURSE TREATMENT ELAPSED DAYS: 26
RAD ONC ARIA FIRST TREATMENT DATE: NORMAL
RAD ONC ARIA PLAN FRACTIONS TREATED TO DATE: 16
RAD ONC ARIA PLAN ID: NORMAL
RAD ONC ARIA PLAN PRESCRIBED DOSE PER FRACTION: 2 GY
RAD ONC ARIA PLAN PRIMARY REFERENCE POINT: NORMAL
RAD ONC ARIA PLAN TOTAL FRACTIONS PRESCRIBED: 23
RAD ONC ARIA PLAN TOTAL PRESCRIBED DOSE: 4600 CGY
RAD ONC ARIA REFERENCE POINT DOSAGE GIVEN TO DATE: 32 GY
RAD ONC ARIA REFERENCE POINT DOSAGE GIVEN TO DATE: 9.68 GY
RAD ONC ARIA REFERENCE POINT ID: NORMAL
RAD ONC ARIA REFERENCE POINT ID: NORMAL
RAD ONC ARIA REFERENCE POINT SESSION DOSAGE GIVEN: 0.6 GY
RAD ONC ARIA REFERENCE POINT SESSION DOSAGE GIVEN: 2 GY

## 2023-02-06 PROCEDURE — 77386: CPT | Performed by: RADIOLOGY

## 2023-02-08 ENCOUNTER — HOSPITAL ENCOUNTER (OUTPATIENT)
Dept: RADIATION ONCOLOGY | Facility: HOSPITAL | Age: 83
Setting detail: RADIATION/ONCOLOGY SERIES
Discharge: HOME | End: 2023-02-08
Payer: MEDICARE

## 2023-02-08 LAB
RAD ONC ARIA COURSE ID: NORMAL
RAD ONC ARIA COURSE LAST TREATMENT DATE: NORMAL
RAD ONC ARIA COURSE START DATE: NORMAL
RAD ONC ARIA COURSE TREATMENT ELAPSED DAYS: 28
RAD ONC ARIA FIRST TREATMENT DATE: NORMAL
RAD ONC ARIA PLAN FRACTIONS TREATED TO DATE: 17
RAD ONC ARIA PLAN ID: NORMAL
RAD ONC ARIA PLAN PRESCRIBED DOSE PER FRACTION: 2 GY
RAD ONC ARIA PLAN PRIMARY REFERENCE POINT: NORMAL
RAD ONC ARIA PLAN TOTAL FRACTIONS PRESCRIBED: 23
RAD ONC ARIA PLAN TOTAL PRESCRIBED DOSE: 4600 CGY
RAD ONC ARIA REFERENCE POINT DOSAGE GIVEN TO DATE: 10.28 GY
RAD ONC ARIA REFERENCE POINT DOSAGE GIVEN TO DATE: 34 GY
RAD ONC ARIA REFERENCE POINT ID: NORMAL
RAD ONC ARIA REFERENCE POINT ID: NORMAL
RAD ONC ARIA REFERENCE POINT SESSION DOSAGE GIVEN: 0.6 GY
RAD ONC ARIA REFERENCE POINT SESSION DOSAGE GIVEN: 2 GY

## 2023-02-08 PROCEDURE — 77386: CPT | Performed by: RADIOLOGY

## 2023-02-09 ENCOUNTER — HOSPITAL ENCOUNTER (OUTPATIENT)
Dept: RADIATION ONCOLOGY | Facility: HOSPITAL | Age: 83
Setting detail: RADIATION/ONCOLOGY SERIES
Discharge: HOME | End: 2023-02-09
Payer: MEDICARE

## 2023-02-09 LAB
RAD ONC ARIA COURSE ID: NORMAL
RAD ONC ARIA COURSE LAST TREATMENT DATE: NORMAL
RAD ONC ARIA COURSE START DATE: NORMAL
RAD ONC ARIA COURSE TREATMENT ELAPSED DAYS: 29
RAD ONC ARIA FIRST TREATMENT DATE: NORMAL
RAD ONC ARIA PLAN FRACTIONS TREATED TO DATE: 18
RAD ONC ARIA PLAN ID: NORMAL
RAD ONC ARIA PLAN PRESCRIBED DOSE PER FRACTION: 2 GY
RAD ONC ARIA PLAN PRIMARY REFERENCE POINT: NORMAL
RAD ONC ARIA PLAN TOTAL FRACTIONS PRESCRIBED: 23
RAD ONC ARIA PLAN TOTAL PRESCRIBED DOSE: 4600 CGY
RAD ONC ARIA REFERENCE POINT DOSAGE GIVEN TO DATE: 10.89 GY
RAD ONC ARIA REFERENCE POINT DOSAGE GIVEN TO DATE: 36 GY
RAD ONC ARIA REFERENCE POINT ID: NORMAL
RAD ONC ARIA REFERENCE POINT ID: NORMAL
RAD ONC ARIA REFERENCE POINT SESSION DOSAGE GIVEN: 0.6 GY
RAD ONC ARIA REFERENCE POINT SESSION DOSAGE GIVEN: 2 GY

## 2023-02-09 PROCEDURE — 77336 RADIATION PHYSICS CONSULT: CPT | Performed by: RADIOLOGY

## 2023-02-09 PROCEDURE — 77386: CPT | Performed by: RADIOLOGY

## 2023-02-10 ENCOUNTER — HOSPITAL ENCOUNTER (OUTPATIENT)
Dept: RADIATION ONCOLOGY | Facility: HOSPITAL | Age: 83
Setting detail: RADIATION/ONCOLOGY SERIES
Discharge: HOME | End: 2023-02-10
Payer: MEDICARE

## 2023-02-10 LAB
RAD ONC ARIA COURSE ID: NORMAL
RAD ONC ARIA COURSE LAST TREATMENT DATE: NORMAL
RAD ONC ARIA COURSE START DATE: NORMAL
RAD ONC ARIA COURSE TREATMENT ELAPSED DAYS: 30
RAD ONC ARIA FIRST TREATMENT DATE: NORMAL
RAD ONC ARIA PLAN FRACTIONS TREATED TO DATE: 19
RAD ONC ARIA PLAN ID: NORMAL
RAD ONC ARIA PLAN PRESCRIBED DOSE PER FRACTION: 2 GY
RAD ONC ARIA PLAN PRIMARY REFERENCE POINT: NORMAL
RAD ONC ARIA PLAN TOTAL FRACTIONS PRESCRIBED: 23
RAD ONC ARIA PLAN TOTAL PRESCRIBED DOSE: 4600 CGY
RAD ONC ARIA REFERENCE POINT DOSAGE GIVEN TO DATE: 11.49 GY
RAD ONC ARIA REFERENCE POINT DOSAGE GIVEN TO DATE: 38 GY
RAD ONC ARIA REFERENCE POINT ID: NORMAL
RAD ONC ARIA REFERENCE POINT ID: NORMAL
RAD ONC ARIA REFERENCE POINT SESSION DOSAGE GIVEN: 0.6 GY
RAD ONC ARIA REFERENCE POINT SESSION DOSAGE GIVEN: 2 GY

## 2023-02-10 PROCEDURE — 77386: CPT | Performed by: RADIOLOGY

## 2023-02-13 ENCOUNTER — HOSPITAL ENCOUNTER (OUTPATIENT)
Dept: RADIATION ONCOLOGY | Facility: HOSPITAL | Age: 83
Setting detail: RADIATION/ONCOLOGY SERIES
Discharge: HOME | End: 2023-02-13
Payer: MEDICARE

## 2023-02-13 LAB
RAD ONC ARIA COURSE ID: NORMAL
RAD ONC ARIA COURSE LAST TREATMENT DATE: NORMAL
RAD ONC ARIA COURSE START DATE: NORMAL
RAD ONC ARIA COURSE TREATMENT ELAPSED DAYS: 33
RAD ONC ARIA FIRST TREATMENT DATE: NORMAL
RAD ONC ARIA PLAN FRACTIONS TREATED TO DATE: 20
RAD ONC ARIA PLAN ID: NORMAL
RAD ONC ARIA PLAN PRESCRIBED DOSE PER FRACTION: 2 GY
RAD ONC ARIA PLAN PRIMARY REFERENCE POINT: NORMAL
RAD ONC ARIA PLAN TOTAL FRACTIONS PRESCRIBED: 23
RAD ONC ARIA PLAN TOTAL PRESCRIBED DOSE: 4600 CGY
RAD ONC ARIA REFERENCE POINT DOSAGE GIVEN TO DATE: 12.1 GY
RAD ONC ARIA REFERENCE POINT DOSAGE GIVEN TO DATE: 40 GY
RAD ONC ARIA REFERENCE POINT ID: NORMAL
RAD ONC ARIA REFERENCE POINT ID: NORMAL
RAD ONC ARIA REFERENCE POINT SESSION DOSAGE GIVEN: 0.6 GY
RAD ONC ARIA REFERENCE POINT SESSION DOSAGE GIVEN: 2 GY

## 2023-02-13 PROCEDURE — 77386: CPT | Performed by: RADIOLOGY

## 2023-02-14 ENCOUNTER — HOSPITAL ENCOUNTER (OUTPATIENT)
Dept: RADIATION ONCOLOGY | Facility: HOSPITAL | Age: 83
Setting detail: RADIATION/ONCOLOGY SERIES
Discharge: HOME | End: 2023-02-14
Payer: MEDICARE

## 2023-02-14 LAB
RAD ONC ARIA COURSE ID: NORMAL
RAD ONC ARIA COURSE LAST TREATMENT DATE: NORMAL
RAD ONC ARIA COURSE START DATE: NORMAL
RAD ONC ARIA COURSE TREATMENT ELAPSED DAYS: 34
RAD ONC ARIA FIRST TREATMENT DATE: NORMAL
RAD ONC ARIA PLAN FRACTIONS TREATED TO DATE: 21
RAD ONC ARIA PLAN ID: NORMAL
RAD ONC ARIA PLAN PRESCRIBED DOSE PER FRACTION: 2 GY
RAD ONC ARIA PLAN PRIMARY REFERENCE POINT: NORMAL
RAD ONC ARIA PLAN TOTAL FRACTIONS PRESCRIBED: 23
RAD ONC ARIA PLAN TOTAL PRESCRIBED DOSE: 4600 CGY
RAD ONC ARIA REFERENCE POINT DOSAGE GIVEN TO DATE: 12.7 GY
RAD ONC ARIA REFERENCE POINT DOSAGE GIVEN TO DATE: 42 GY
RAD ONC ARIA REFERENCE POINT ID: NORMAL
RAD ONC ARIA REFERENCE POINT ID: NORMAL
RAD ONC ARIA REFERENCE POINT SESSION DOSAGE GIVEN: 0.6 GY
RAD ONC ARIA REFERENCE POINT SESSION DOSAGE GIVEN: 2 GY

## 2023-02-14 PROCEDURE — 77386: CPT | Performed by: RADIOLOGY

## 2023-02-15 ENCOUNTER — HOSPITAL ENCOUNTER (OUTPATIENT)
Dept: RADIATION ONCOLOGY | Facility: HOSPITAL | Age: 83
Setting detail: RADIATION/ONCOLOGY SERIES
Discharge: HOME | End: 2023-02-15
Payer: MEDICARE

## 2023-02-15 LAB
RAD ONC ARIA COURSE ID: NORMAL
RAD ONC ARIA COURSE LAST TREATMENT DATE: NORMAL
RAD ONC ARIA COURSE START DATE: NORMAL
RAD ONC ARIA COURSE TREATMENT ELAPSED DAYS: 35
RAD ONC ARIA FIRST TREATMENT DATE: NORMAL
RAD ONC ARIA PLAN FRACTIONS TREATED TO DATE: 22
RAD ONC ARIA PLAN ID: NORMAL
RAD ONC ARIA PLAN PRESCRIBED DOSE PER FRACTION: 2 GY
RAD ONC ARIA PLAN PRIMARY REFERENCE POINT: NORMAL
RAD ONC ARIA PLAN TOTAL FRACTIONS PRESCRIBED: 23
RAD ONC ARIA PLAN TOTAL PRESCRIBED DOSE: 4600 CGY
RAD ONC ARIA REFERENCE POINT DOSAGE GIVEN TO DATE: 13.31 GY
RAD ONC ARIA REFERENCE POINT DOSAGE GIVEN TO DATE: 44 GY
RAD ONC ARIA REFERENCE POINT ID: NORMAL
RAD ONC ARIA REFERENCE POINT ID: NORMAL
RAD ONC ARIA REFERENCE POINT SESSION DOSAGE GIVEN: 0.6 GY
RAD ONC ARIA REFERENCE POINT SESSION DOSAGE GIVEN: 2 GY

## 2023-02-15 PROCEDURE — 77386: CPT | Performed by: RADIOLOGY

## 2023-02-16 ENCOUNTER — HOSPITAL ENCOUNTER (OUTPATIENT)
Dept: RADIATION ONCOLOGY | Facility: HOSPITAL | Age: 83
Setting detail: RADIATION/ONCOLOGY SERIES
Discharge: HOME | End: 2023-02-16
Payer: MEDICARE

## 2023-02-16 LAB
RAD ONC ARIA COURSE ID: NORMAL
RAD ONC ARIA COURSE LAST TREATMENT DATE: NORMAL
RAD ONC ARIA COURSE START DATE: NORMAL
RAD ONC ARIA COURSE TREATMENT ELAPSED DAYS: 36
RAD ONC ARIA FIRST TREATMENT DATE: NORMAL
RAD ONC ARIA PLAN FRACTIONS TREATED TO DATE: 23
RAD ONC ARIA PLAN ID: NORMAL
RAD ONC ARIA PLAN PRESCRIBED DOSE PER FRACTION: 2 GY
RAD ONC ARIA PLAN PRIMARY REFERENCE POINT: NORMAL
RAD ONC ARIA PLAN TOTAL FRACTIONS PRESCRIBED: 23
RAD ONC ARIA PLAN TOTAL PRESCRIBED DOSE: 4600 CGY
RAD ONC ARIA REFERENCE POINT DOSAGE GIVEN TO DATE: 13.91 GY
RAD ONC ARIA REFERENCE POINT DOSAGE GIVEN TO DATE: 46 GY
RAD ONC ARIA REFERENCE POINT ID: NORMAL
RAD ONC ARIA REFERENCE POINT ID: NORMAL
RAD ONC ARIA REFERENCE POINT SESSION DOSAGE GIVEN: 0.6 GY
RAD ONC ARIA REFERENCE POINT SESSION DOSAGE GIVEN: 2 GY

## 2023-02-16 PROCEDURE — 77386: CPT | Performed by: RADIOLOGY

## 2023-02-16 PROCEDURE — 77336 RADIATION PHYSICS CONSULT: CPT | Performed by: RADIOLOGY

## 2023-02-17 ENCOUNTER — HOSPITAL ENCOUNTER (OUTPATIENT)
Dept: RADIATION ONCOLOGY | Facility: HOSPITAL | Age: 83
Setting detail: RADIATION/ONCOLOGY SERIES
Discharge: HOME | End: 2023-02-17
Payer: MEDICARE

## 2023-02-17 LAB
RAD ONC ARIA COURSE ID: NORMAL
RAD ONC ARIA COURSE LAST TREATMENT DATE: NORMAL
RAD ONC ARIA COURSE START DATE: NORMAL
RAD ONC ARIA COURSE TREATMENT ELAPSED DAYS: 37
RAD ONC ARIA FIRST TREATMENT DATE: NORMAL
RAD ONC ARIA PLAN FRACTIONS TREATED TO DATE: 1
RAD ONC ARIA PLAN ID: NORMAL
RAD ONC ARIA PLAN PRESCRIBED DOSE PER FRACTION: 2 GY
RAD ONC ARIA PLAN PRIMARY REFERENCE POINT: NORMAL
RAD ONC ARIA PLAN TOTAL FRACTIONS PRESCRIBED: 12
RAD ONC ARIA PLAN TOTAL PRESCRIBED DOSE: 2400 CGY
RAD ONC ARIA REFERENCE POINT DOSAGE GIVEN TO DATE: 14.04 GY
RAD ONC ARIA REFERENCE POINT DOSAGE GIVEN TO DATE: 48 GY
RAD ONC ARIA REFERENCE POINT ID: NORMAL
RAD ONC ARIA REFERENCE POINT ID: NORMAL
RAD ONC ARIA REFERENCE POINT SESSION DOSAGE GIVEN: 0.13 GY
RAD ONC ARIA REFERENCE POINT SESSION DOSAGE GIVEN: 2 GY

## 2023-02-17 PROCEDURE — 77386: CPT | Performed by: RADIOLOGY

## 2023-02-20 ENCOUNTER — HOSPITAL ENCOUNTER (OUTPATIENT)
Dept: RADIATION ONCOLOGY | Facility: HOSPITAL | Age: 83
Setting detail: RADIATION/ONCOLOGY SERIES
Discharge: HOME | End: 2023-02-20
Payer: MEDICARE

## 2023-02-20 LAB
RAD ONC ARIA COURSE ID: NORMAL
RAD ONC ARIA COURSE LAST TREATMENT DATE: NORMAL
RAD ONC ARIA COURSE START DATE: NORMAL
RAD ONC ARIA COURSE TREATMENT ELAPSED DAYS: 40
RAD ONC ARIA FIRST TREATMENT DATE: NORMAL
RAD ONC ARIA PLAN FRACTIONS TREATED TO DATE: 2
RAD ONC ARIA PLAN ID: NORMAL
RAD ONC ARIA PLAN PRESCRIBED DOSE PER FRACTION: 2 GY
RAD ONC ARIA PLAN PRIMARY REFERENCE POINT: NORMAL
RAD ONC ARIA PLAN TOTAL FRACTIONS PRESCRIBED: 12
RAD ONC ARIA PLAN TOTAL PRESCRIBED DOSE: 2400 CGY
RAD ONC ARIA REFERENCE POINT DOSAGE GIVEN TO DATE: 14.18 GY
RAD ONC ARIA REFERENCE POINT DOSAGE GIVEN TO DATE: 50 GY
RAD ONC ARIA REFERENCE POINT ID: NORMAL
RAD ONC ARIA REFERENCE POINT ID: NORMAL
RAD ONC ARIA REFERENCE POINT SESSION DOSAGE GIVEN: 0.13 GY
RAD ONC ARIA REFERENCE POINT SESSION DOSAGE GIVEN: 2 GY

## 2023-02-20 PROCEDURE — 77386: CPT | Performed by: RADIOLOGY

## 2023-02-21 ENCOUNTER — HOSPITAL ENCOUNTER (OUTPATIENT)
Dept: RADIATION ONCOLOGY | Facility: HOSPITAL | Age: 83
Setting detail: RADIATION/ONCOLOGY SERIES
Discharge: HOME | End: 2023-02-21
Payer: MEDICARE

## 2023-02-21 LAB
RAD ONC ARIA COURSE ID: NORMAL
RAD ONC ARIA COURSE LAST TREATMENT DATE: NORMAL
RAD ONC ARIA COURSE START DATE: NORMAL
RAD ONC ARIA COURSE TREATMENT ELAPSED DAYS: 41
RAD ONC ARIA FIRST TREATMENT DATE: NORMAL
RAD ONC ARIA PLAN FRACTIONS TREATED TO DATE: 3
RAD ONC ARIA PLAN ID: NORMAL
RAD ONC ARIA PLAN PRESCRIBED DOSE PER FRACTION: 2 GY
RAD ONC ARIA PLAN PRIMARY REFERENCE POINT: NORMAL
RAD ONC ARIA PLAN TOTAL FRACTIONS PRESCRIBED: 12
RAD ONC ARIA PLAN TOTAL PRESCRIBED DOSE: 2400 CGY
RAD ONC ARIA REFERENCE POINT DOSAGE GIVEN TO DATE: 14.31 GY
RAD ONC ARIA REFERENCE POINT DOSAGE GIVEN TO DATE: 52 GY
RAD ONC ARIA REFERENCE POINT ID: NORMAL
RAD ONC ARIA REFERENCE POINT ID: NORMAL
RAD ONC ARIA REFERENCE POINT SESSION DOSAGE GIVEN: 0.13 GY
RAD ONC ARIA REFERENCE POINT SESSION DOSAGE GIVEN: 2 GY

## 2023-02-21 PROCEDURE — 77386: CPT | Performed by: RADIOLOGY

## 2023-02-22 ENCOUNTER — HOSPITAL ENCOUNTER (OUTPATIENT)
Dept: RADIATION ONCOLOGY | Facility: HOSPITAL | Age: 83
Setting detail: RADIATION/ONCOLOGY SERIES
Discharge: HOME | End: 2023-02-22
Payer: MEDICARE

## 2023-02-22 LAB
RAD ONC ARIA COURSE ID: NORMAL
RAD ONC ARIA COURSE LAST TREATMENT DATE: NORMAL
RAD ONC ARIA COURSE START DATE: NORMAL
RAD ONC ARIA COURSE TREATMENT ELAPSED DAYS: 42
RAD ONC ARIA FIRST TREATMENT DATE: NORMAL
RAD ONC ARIA PLAN FRACTIONS TREATED TO DATE: 4
RAD ONC ARIA PLAN ID: NORMAL
RAD ONC ARIA PLAN PRESCRIBED DOSE PER FRACTION: 2 GY
RAD ONC ARIA PLAN PRIMARY REFERENCE POINT: NORMAL
RAD ONC ARIA PLAN TOTAL FRACTIONS PRESCRIBED: 12
RAD ONC ARIA PLAN TOTAL PRESCRIBED DOSE: 2400 CGY
RAD ONC ARIA REFERENCE POINT DOSAGE GIVEN TO DATE: 14.44 GY
RAD ONC ARIA REFERENCE POINT DOSAGE GIVEN TO DATE: 54 GY
RAD ONC ARIA REFERENCE POINT ID: NORMAL
RAD ONC ARIA REFERENCE POINT ID: NORMAL
RAD ONC ARIA REFERENCE POINT SESSION DOSAGE GIVEN: 0.13 GY
RAD ONC ARIA REFERENCE POINT SESSION DOSAGE GIVEN: 2 GY

## 2023-02-22 PROCEDURE — 77386: CPT | Performed by: RADIOLOGY

## 2023-02-23 ENCOUNTER — HOSPITAL ENCOUNTER (OUTPATIENT)
Dept: RADIATION ONCOLOGY | Facility: HOSPITAL | Age: 83
Setting detail: RADIATION/ONCOLOGY SERIES
Discharge: HOME | End: 2023-02-23
Payer: MEDICARE

## 2023-02-23 LAB
RAD ONC ARIA COURSE ID: NORMAL
RAD ONC ARIA COURSE LAST TREATMENT DATE: NORMAL
RAD ONC ARIA COURSE START DATE: NORMAL
RAD ONC ARIA COURSE TREATMENT ELAPSED DAYS: 43
RAD ONC ARIA FIRST TREATMENT DATE: NORMAL
RAD ONC ARIA PLAN FRACTIONS TREATED TO DATE: 5
RAD ONC ARIA PLAN ID: NORMAL
RAD ONC ARIA PLAN PRESCRIBED DOSE PER FRACTION: 2 GY
RAD ONC ARIA PLAN PRIMARY REFERENCE POINT: NORMAL
RAD ONC ARIA PLAN TOTAL FRACTIONS PRESCRIBED: 12
RAD ONC ARIA PLAN TOTAL PRESCRIBED DOSE: 2400 CGY
RAD ONC ARIA REFERENCE POINT DOSAGE GIVEN TO DATE: 14.57 GY
RAD ONC ARIA REFERENCE POINT DOSAGE GIVEN TO DATE: 56 GY
RAD ONC ARIA REFERENCE POINT ID: NORMAL
RAD ONC ARIA REFERENCE POINT ID: NORMAL
RAD ONC ARIA REFERENCE POINT SESSION DOSAGE GIVEN: 0.13 GY
RAD ONC ARIA REFERENCE POINT SESSION DOSAGE GIVEN: 2 GY

## 2023-02-23 PROCEDURE — 77386: CPT | Performed by: RADIOLOGY

## 2023-02-23 NOTE — PROGRESS NOTES
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  She is currently receiving definitive intent radiotherapy (anticipate 7000 cGy in 35 daily fractions - 1/11-present- 29/35 fx). .  She is accompanied by her granddaughter, Keturah Bonner     I have reviewed the HPI and verified with the patient the accuracy of it. No changes to interval history since the information was documented. Reynaldo Mock MD 02/24/23     Diagnostic abnormalities:  --History includes diabetes, and was previously followed by Dr. Beth Richards, [facility] for chronic thrombocytopenia.  Question ITP vs MDS.  Last seen in office by Dr. Richards, 07/08/2017.  At that time platelets have been stable for the past few years without active bleeding.  Platelet count was 23,000,.  To a baseline of 30,000.  No aggressive treatments were pursued.  Previous bone marrow biopsy, 11/05/2012 showed hypercellular marrow showing maturing trilineage hematopoiesis with mild dyserythropoiesis and mild megakaryocytic atypia.  No increase in blasts.  Plan: If platelet continues to worsen drops to 10,000-20,000 or evidence of active bleeding then platelet transfusion can be tried and if this does not work then immunosuppressant such as prednisone will be next treatment option.  --07/29/2022-seen by PCP, Dr. Marvin Vasques.  Patient referred to ENT after right ear exam showed stenotic ear canal with a vestibule showing scaly plaques/mass/neoplasm?  --09/01/2022- seen for ENT by Dr. Cao.  Right ear scabby and draining puslike substance for the past 1 year.  Patient stated that she  had a lesion to her right ear for the past year that has progressively gotten larger, and had blistered few days prior before starting to drain.  --09/23/2022- CT temporal bones-1. Abnormal skin thickening and enhancement at the right acoustic meatus. Recommend clinical correlation/visual inspection in this area. The right external canal is near completely opacified with soft tissue debris. I do not see destructive bony change along the external canal. 2. Middle ear cavities and mastoids are clear. Ossicular chains are intact.3. Nearly dehiscent superior semicircular canals, bilateral.  -- 11/03/2022- 3 mm punch biopsy lesion from jamari right ear.  Microscopic diagnosis: Basal cell carcinoma-surrounded by scar tissue (shave specimen and superficial skin)  --11/28/2022 -CMP normal.  B12 1194, folate>20, ferritin 65, iron, iron saturation 20%, hemoglobin 11.9, hematocrit 38.8, MCV 90.9, WBC 8.84, platelets 48,000  -- 12/05/2022 - CT head- 1. Age-appropriate atrophy. 2. Otherwise normal CT of the brain with and without contrast.  -- 12/05/2022- CT soft tissue neck-  1. 2.0 x 1.8 x 1.2 cm enhancing skin lesion involving the jamari of the right ear. Up to 1.1 cm depth of invasion with the deep margin essentially abutting the bony elements of the external auditory canal along the roof and floor of the external canal. There is also a 7 mm depth of invasion inferiorly, with the lesion abutting the capsule of the superficial lobe of the right parotid gland. There is a 7 mm hyperenhancing lymph node in the upper level 2B neck. This is not technically enlarged by size criteria but this does appear to be hyperenhancing relative to the other cervical nodes and I'm suspicious that this is a leyda metastasis.  -- 12/05/2022- CT chest- 1. Rather benign-appearing subpleural pulmonary nodule in the right lower lobe which could be followed up on subsequent imaging. There is mild basilar atelectasis as well as linear scarring or  atelectasis in the left upper lobe. No evidence of consolidative pneumonia. 2. Heavy coronary calcifications are present. There is mild cardiomegaly. The thoracic aorta and great vessels are ectatic. 3. No convincing evidence of metastatic disease to the thorax. 4. Hiatal hernia. There is a cortical cyst of the upper pole of the left kidney.  -- 12/05/2022 - CT abdomen/pelvis- 1. No radiographic evidence of metastatic disease to the abdomen or pelvis. 2. Multiple compression deformities in the thoracic and lumbar spine. The fracture at L3 is more acute to subacute in appearance and should be correlated clinically. There is an accentuated thoracic kyphosis with mild gibbus deformity with the thoracolumbar juncture. 3. Cortical cyst upper pole left kidney. No evidence of nephrolithiasis or obstructive uropathy. 4. Moderate hiatal hernia. 5. Heavy atheromatous calcification of the abdominal aorta and iliac arteries with no evidence of aneurysm.  --12/09/2022- Consult-Dr. Rhodes of radiation oncology- RECOMMENDATIONS: Rosanna Chaparro has been referred to our office today to discuss radiotherapy recommendations for locally advanced high risk basal cell skin cancer of the right ear acoustic meatus obstructing the distal auditory canal.  Tumor burden includes a 2 cm tumor present at the acoustic meatus causing decreased unilatera hearing with up to 1.1 cm depth of invasion with the deep margin essentially abutting the bony elements of the external auditory canal along the roof and floor of the external canaliorly, with the lesion abutting the capsule of the superficial lobe of the right parotid gland. There is a 7 mm hyperenhancing lymph node in the upper level 2B neck.  Per radiology this is not technically enlarged by size criteria but this does appear to be hyperenhancing relative to the other cervical nodes and I'm suspicious that this is a leyda metastasis, thus T1N0 MX versus T1N1MX presentation.     Patient is  considering treatment options that have been offered to her including surgical resection and systemic therapy with vismodegib.  She presents today for consultation regarding radiotherapy options.     We have discussed the indications and rationale of radiation therapy according to the NCCN Guidelines.  Given a prominent ipsilateral neck lymph node that measures 7 mm with hyperintensity on CT scan, I discussed possibility of obtaining a biopsy or proceeding with PET scan to rule out regional leyda spread.  I do believe definitive intent radiotherapy will be a good option for her but she is also considering surgical and systemic therapy options.  I have extensively reviewed the risks, benefits and alternatives of therapy and progression of disease in spite of therapy with either local or systemic failure.   I have seen, examined and reviewed her medication list, appropriate labs and imaging studies as well as other physician notes. We discussed the goals/plans of care and answered all questions.      In summary, 3 options were presented to her which included needle biopsy of a prominent ipsilateral neck lymph node, which of positive may lend itself to additional treatment recommendations, versus PET scan staging, versus proceeding with definitive intent radiotherapy to include primary lesion and suspicious ipsilateral lymphatic region.  I explained the intent, benefits, course, precautions, and side effects of treatment which include worsening skin reactions including erythema and skin desquamation, radiation-induced hearing changes, radiation-induced salivary gland dysfunction which can lead to xerostomia, patchy alopecia, fatigue and possibly ipsilateral oropharyngeal mucositis.  Their questions were answered to their satisfaction and at the end of our discussion patient and her granddaughter wished to discuss the totality of treatment options presented to them with her family and call back with a decision.  If  "patient wishes to proceed with definitive intent radiotherapy, anticipate treating to a dose of 7000 cGy to be given over 35 daily fractions (Monday through Friday).  We will await patient call back with a decision.    --01/13/2023- PET scan:  1.  2.2 x 1.1 cm hypermetabolic soft tissue thickening in the RIGHT external auditory canal, correlating with biopsy-proven basal cell carcinoma. 2.  No evidence of hypermetabolic metastatic disease in the head, neck, chest, abdomen, pelvis, or bilateral lower extremities. 3.  Moderate size hiatal hernia    Previous interventions:  -- pending definitive intent radiotherapy, anticipate treating to a dose of 7000 cGy to be given over 35 daily fractions (Monday through Friday)- .1/11/2023- present (29/35 fx)     PAST MEDICAL HISTORY:  ALLERGIES:  Allergies   Allergen Reactions   • Tramadol Other (See Comments)     Made her \"crazy\"     CURRENT MEDICATIONS:  Outpatient Encounter Medications as of 2/24/2023   Medication Sig Dispense Refill   • ascorbic acid (VITAMIN C) 1000 MG tablet Take 1,000 mg by mouth Daily.     • CALCIUM PO Take 1 tablet by mouth Daily.     • CRANBERRY PO Take 1 tablet by mouth Daily.     • Multiple Vitamins-Minerals (EMERGEN-C IMMUNE PLUS PO) Take 1 tablet by mouth Daily.     • Vitamin D, Cholecalciferol, (CHOLECALCIFEROL) 10 MCG (400 UNIT) tablet Take 400 Units by mouth Daily.       No facility-administered encounter medications on file as of 2/24/2023.     Adult illnesses:  Diabetes mellitus  Chronic thrombocytopenia (ITP+/- MDS)  Former smoker  Basal cell carcinoma right ear    Past surgeries:  Laparoscopic inguinal hernia repair  Appendectomy  Cholecystectomy, 2000  Renal lithotripsy x3 for renal stones, most recently 03/28/2018  Orthopedic surgery: Left shoulder fracture repair, 2018 with blood transfusion  Biopsy right ear jamari, 11/3/2022    ADULT ILLNESSES:  Patient Active Problem List   Diagnosis Code   • Neoplasm of uncertain behavior of ear D48.5 " "  • Basal cell carcinoma C44.91   • Non-smoker Z78.9   • Thrombocytopenia (HCC) D69.6     SURGERIES:  Past Surgical History:   Procedure Laterality Date   • APPENDECTOMY     • CHOLECYSTECTOMY     • SHOULDER SURGERY Left      HEALTH MAINTENANCE ITEMS:  Health Maintenance Due   Topic Date Due   • URINE MICROALBUMIN  Never done   • DXA SCAN  Never done   • COVID-19 Vaccine (1) Never done   • Pneumococcal Vaccine 65+ (1 - PCV) Never done   • TDAP/TD VACCINES (1 - Tdap) Never done   • ZOSTER VACCINE (1 of 2) Never done   • INFLUENZA VACCINE  Never done   • ANNUAL WELLNESS VISIT  Never done   • HEMOGLOBIN A1C  Never done   • DIABETIC EYE EXAM  Never done       <no information>  Last Completed Colonoscopy     This patient has no relevant Health Maintenance data.          There is no immunization history on file for this patient.  Last Completed Mammogram     This patient has no relevant Health Maintenance data.            FAMILY HISTORY:  No family history on file.  SOCIAL HISTORY:  Social History     Socioeconomic History   • Marital status:    Tobacco Use   • Smoking status: Never   • Smokeless tobacco: Never   Vaping Use   • Vaping Use: Never used   Substance and Sexual Activity   • Alcohol use: Never   • Drug use: Never       REVIEW OF SYSTEMS:  Review of Systems   Constitutional: Negative.  Negative for fatigue.        Manages her personal ADLs, house chores, but does not run errands on her own and has not been driving. \"I could if my  would let me.\" Is up and about \"most all the time.\" Has a rolling walker at home that she hardly uses.  \"I just have it in case I need to walk far.\"   HENT: Positive for hearing loss (Non-healing lesion in the right ear for over a year.  \"The spot in my ear is starting to blocking the way\").    Eyes: Negative.    Respiratory: Negative.    Cardiovascular: Negative.    Gastrointestinal: Negative.    Endocrine: Negative.    Genitourinary: Negative.    Musculoskeletal: " "Negative for arthralgias.   Skin: Positive for skin lesions (Right ear canal -non-healing skin lesion).   Allergic/Immunologic: Negative.    Neurological: Negative.    Hematological: Negative.    Psychiatric/Behavioral: Negative.      /72   Pulse 78   Temp 98.2 °F (36.8 °C)   Resp 18   Ht 167.6 cm (66\")   Wt 72.8 kg (160 lb 8 oz)   LMP  (LMP Unknown)   SpO2 100%   BMI 25.91 kg/m²  Body surface area is 1.82 meters squared.  Pain Score    02/24/23 1125   PainSc: 0-No pain       Physical Exam:  Physical Exam  Vitals reviewed.   Constitutional:       Comments: Pleasant, cooperative, modestly kept, heavy-set elderly female.  Brought in by wheelchair but is otherwise ambulatory.  ECOG 0-1.      Has lost 4 lb (in addition to 3 lb at her prior visit) since the last visit   HENT:      Head: Normocephalic and atraumatic.      Ears:      Comments: Again note stenotic ear canal with acoustic meatus again notable for scaly scabs both anteriorly and posteriorly that are visibly regressing since receipt of RT.  RT associated skin hyperemia surrounding the lesion     Mouth/Throat:      Comments: Is wearing a surgical mask today  Eyes:      General: No scleral icterus.     Extraocular Movements: Extraocular movements intact.      Conjunctiva/sclera: Conjunctivae normal.      Pupils: Pupils are equal, round, and reactive to light.   Cardiovascular:      Rate and Rhythm: Normal rate.   Pulmonary:      Effort: Pulmonary effort is normal.   Abdominal:      Palpations: Abdomen is soft.      Tenderness: There is no abdominal tenderness.   Musculoskeletal:         General: Normal range of motion.      Cervical back: Normal range of motion.   Lymphadenopathy:      Head:      Right side of head: No submental, submandibular, preauricular, posterior auricular or occipital adenopathy.      Left side of head: No submental, submandibular, preauricular, posterior auricular or occipital adenopathy.      Cervical: No cervical " adenopathy.      Right cervical: No superficial, deep or posterior cervical adenopathy.     Left cervical: No superficial, deep or posterior cervical adenopathy.      Upper Body:      Right upper body: No supraclavicular or axillary adenopathy.      Left upper body: No supraclavicular or axillary adenopathy.   Skin:     Findings: Lesion (right ear canal) present.   Neurological:      General: No focal deficit present.      Mental Status: She is alert and oriented to person, place, and time.   Psychiatric:         Mood and Affect: Mood normal.         Thought Content: Thought content normal.         Assessment:  1.   Basal cell carcinoma of the right ear acoustic meatus  -- Tumor stage: cT1, cN0   -- Tumor burden/complications:  2 cm tumor present at the acoustic meatus causing decreased unilateral hearing with up to 1.1 cm depth of invasion with the deep margin essentially abutting the bony elements of the external auditory canal along the roof and floor of the external canaliorly, with the lesion abutting the capsule of the superficial lobe of the right parotid gland. There is a 7 mm hyperenhancing lymph node in the upper level 2B neck.  Per radiology this is not technically enlarged by size criteria but this does appear to be hyperenhancing relative to the other cervical nodes suspicious for leyda metastasis  -- High risk of recurrence (lesion on head and neck).  Not felt to be excisional candidate due to locally advanced lesion.  -- Up-to-date algorithm for nonsurgical candidates recommends primary radiation therapy (preferred) or hedgehog pathway inhibitor (preferred) or immune checkpoint inhibitor (cemiplimab).  Clinical follow-up for recurrence every 6-12 months.  --01/13/2023- PET scan:  1.  2.2 x 1.1 cm hypermetabolic soft tissue thickening in the RIGHT external auditory canal, correlating with biopsy-proven basal cell carcinoma. 2.  No evidence of hypermetabolic metastatic disease in the head, neck, chest,  "abdomen, pelvis, or bilateral lower extremities. 3.  Moderate size hiatal hernia  -- Definitive intent radiotherapy (anticipate 7000 cGy in 35 daily fractions - 1/11-present- 27/35 fx)    2.   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 Beth Richards, [facility] for chronic thrombocytopenia.  Question ITP vs MDS.  Last seen in office by Dr. Richards, 07/08/2017.  At that time platelets have been stable for the past few years without active bleeding.  Platelet count was 23,000,.  To a baseline of 30,000.  No aggressive treatments were pursued.  Previous bone marrow biopsy, 11/05/2012 showed hypercellular marrow showing maturing trilineage hematopoiesis with mild dyserythropoiesis and mild megakaryocytic atypia.  No increase in blasts.    --11/28/2022- platelets 48,000 (ramon: 23,000 per records)    3.   Diabetes mellitus.  4.   Advanced age    Plan:  1.   Apprised of the PET scan, 1/13/2023 (above).  No mets  2.   Continue definitive intent radiotherapy (anticipate 7000 cGy in 35 daily fractions - 1/11-present- 27/35 fx)  3.   Erivedge (vismodegib).  Potential toxicities again discussed (to include but not limited to: Liver injury, azotemia, hyponatremia, hypokalemia, Andres-Gulshan syndrome, toxic epidermal necrolysis, muscle spasms, alopecia, dysgeusia, weight loss, fatigue, nausea, amenorrhea, diarrhea, decreased appetite, constipation, arthralgia, vomiting, loss of taste, elevated CK, azotemia.    4.  Discussed that if PET scan does not show metastatic disease and if disease can be encompassed by definitive RT, given patient's advanced age and comorbidities, will consider withholding systemic therapy until she develops more widespread \"measurable disease\".  On the other hand, if PET scan shows metastatic disease, will consider systemic therapy up-front    5.   Continue management per PCP and other specialists  6.   Schedule whole body PET scan in 23 weeks  7.   Return " to office in 24 weeks with preoffice CMP and CBC w diff.      I spent 32 minutes caring for  Rosanna on this date of service. This time includes time spent by me in the following activities: preparing for the visit, reviewing tests, performing a medically appropriate examination and/or evaluation, counseling and educating the patient/family/caregiver, ordering medications, tests, or procedures and documenting information in the medical record.

## 2023-02-24 ENCOUNTER — OFFICE VISIT (OUTPATIENT)
Dept: ONCOLOGY | Facility: CLINIC | Age: 83
End: 2023-02-24
Payer: MEDICARE

## 2023-02-24 ENCOUNTER — HOSPITAL ENCOUNTER (OUTPATIENT)
Dept: RADIATION ONCOLOGY | Facility: HOSPITAL | Age: 83
Setting detail: RADIATION/ONCOLOGY SERIES
Discharge: HOME | End: 2023-02-24
Payer: MEDICARE

## 2023-02-24 VITALS
HEART RATE: 78 BPM | OXYGEN SATURATION: 100 % | RESPIRATION RATE: 18 BRPM | WEIGHT: 160.5 LBS | SYSTOLIC BLOOD PRESSURE: 122 MMHG | DIASTOLIC BLOOD PRESSURE: 72 MMHG | HEIGHT: 66 IN | BODY MASS INDEX: 25.79 KG/M2 | TEMPERATURE: 98.2 F

## 2023-02-24 DIAGNOSIS — R93.0 ABNORMAL FINDINGS ON DIAGNOSTIC IMAGING OF SKULL AND HEAD, NOT ELSEWHERE CLASSIFIED: ICD-10-CM

## 2023-02-24 DIAGNOSIS — C44.211 BASAL CELL CARCINOMA (BCC) OF SKIN OF EAR, UNSPECIFIED LATERALITY: Primary | ICD-10-CM

## 2023-02-24 LAB
RAD ONC ARIA COURSE ID: NORMAL
RAD ONC ARIA COURSE LAST TREATMENT DATE: NORMAL
RAD ONC ARIA COURSE START DATE: NORMAL
RAD ONC ARIA COURSE TREATMENT ELAPSED DAYS: 44
RAD ONC ARIA FIRST TREATMENT DATE: NORMAL
RAD ONC ARIA PLAN FRACTIONS TREATED TO DATE: 6
RAD ONC ARIA PLAN ID: NORMAL
RAD ONC ARIA PLAN PRESCRIBED DOSE PER FRACTION: 2 GY
RAD ONC ARIA PLAN PRIMARY REFERENCE POINT: NORMAL
RAD ONC ARIA PLAN TOTAL FRACTIONS PRESCRIBED: 12
RAD ONC ARIA PLAN TOTAL PRESCRIBED DOSE: 2400 CGY
RAD ONC ARIA REFERENCE POINT DOSAGE GIVEN TO DATE: 14.71 GY
RAD ONC ARIA REFERENCE POINT DOSAGE GIVEN TO DATE: 58 GY
RAD ONC ARIA REFERENCE POINT ID: NORMAL
RAD ONC ARIA REFERENCE POINT ID: NORMAL
RAD ONC ARIA REFERENCE POINT SESSION DOSAGE GIVEN: 0.13 GY
RAD ONC ARIA REFERENCE POINT SESSION DOSAGE GIVEN: 2 GY

## 2023-02-24 PROCEDURE — 99214 OFFICE O/P EST MOD 30 MIN: CPT | Performed by: INTERNAL MEDICINE

## 2023-02-24 PROCEDURE — 77336 RADIATION PHYSICS CONSULT: CPT | Performed by: RADIOLOGY

## 2023-02-24 PROCEDURE — 77386: CPT | Performed by: RADIOLOGY

## 2023-02-27 ENCOUNTER — HOSPITAL ENCOUNTER (OUTPATIENT)
Dept: RADIATION ONCOLOGY | Facility: HOSPITAL | Age: 83
Discharge: HOME | End: 2023-02-27

## 2023-02-27 LAB
RAD ONC ARIA COURSE ID: NORMAL
RAD ONC ARIA COURSE LAST TREATMENT DATE: NORMAL
RAD ONC ARIA COURSE START DATE: NORMAL
RAD ONC ARIA COURSE TREATMENT ELAPSED DAYS: 47
RAD ONC ARIA FIRST TREATMENT DATE: NORMAL
RAD ONC ARIA PLAN FRACTIONS TREATED TO DATE: 7
RAD ONC ARIA PLAN ID: NORMAL
RAD ONC ARIA PLAN PRESCRIBED DOSE PER FRACTION: 2 GY
RAD ONC ARIA PLAN PRIMARY REFERENCE POINT: NORMAL
RAD ONC ARIA PLAN TOTAL FRACTIONS PRESCRIBED: 12
RAD ONC ARIA PLAN TOTAL PRESCRIBED DOSE: 2400 CGY
RAD ONC ARIA REFERENCE POINT DOSAGE GIVEN TO DATE: 14.84 GY
RAD ONC ARIA REFERENCE POINT DOSAGE GIVEN TO DATE: 60 GY
RAD ONC ARIA REFERENCE POINT ID: NORMAL
RAD ONC ARIA REFERENCE POINT ID: NORMAL
RAD ONC ARIA REFERENCE POINT SESSION DOSAGE GIVEN: 0.13 GY
RAD ONC ARIA REFERENCE POINT SESSION DOSAGE GIVEN: 2 GY

## 2023-02-27 PROCEDURE — 77386: CPT | Performed by: RADIOLOGY

## 2023-02-28 ENCOUNTER — HOSPITAL ENCOUNTER (OUTPATIENT)
Dept: RADIATION ONCOLOGY | Facility: HOSPITAL | Age: 83
Discharge: HOME | End: 2023-02-28

## 2023-02-28 LAB
RAD ONC ARIA COURSE ID: NORMAL
RAD ONC ARIA COURSE LAST TREATMENT DATE: NORMAL
RAD ONC ARIA COURSE START DATE: NORMAL
RAD ONC ARIA COURSE TREATMENT ELAPSED DAYS: 48
RAD ONC ARIA FIRST TREATMENT DATE: NORMAL
RAD ONC ARIA PLAN FRACTIONS TREATED TO DATE: 8
RAD ONC ARIA PLAN ID: NORMAL
RAD ONC ARIA PLAN PRESCRIBED DOSE PER FRACTION: 2 GY
RAD ONC ARIA PLAN PRIMARY REFERENCE POINT: NORMAL
RAD ONC ARIA PLAN TOTAL FRACTIONS PRESCRIBED: 12
RAD ONC ARIA PLAN TOTAL PRESCRIBED DOSE: 2400 CGY
RAD ONC ARIA REFERENCE POINT DOSAGE GIVEN TO DATE: 14.97 GY
RAD ONC ARIA REFERENCE POINT DOSAGE GIVEN TO DATE: 62 GY
RAD ONC ARIA REFERENCE POINT ID: NORMAL
RAD ONC ARIA REFERENCE POINT ID: NORMAL
RAD ONC ARIA REFERENCE POINT SESSION DOSAGE GIVEN: 0.13 GY
RAD ONC ARIA REFERENCE POINT SESSION DOSAGE GIVEN: 2 GY

## 2023-02-28 PROCEDURE — 77386: CPT | Performed by: RADIOLOGY

## 2023-03-01 ENCOUNTER — HOSPITAL ENCOUNTER (OUTPATIENT)
Dept: RADIATION ONCOLOGY | Facility: HOSPITAL | Age: 83
Setting detail: RADIATION/ONCOLOGY SERIES
Discharge: HOME | End: 2023-03-01

## 2023-03-01 ENCOUNTER — HOSPITAL ENCOUNTER (OUTPATIENT)
Dept: RADIATION ONCOLOGY | Facility: HOSPITAL | Age: 83
Setting detail: RADIATION/ONCOLOGY SERIES
End: 2023-03-01
Payer: MEDICARE

## 2023-03-01 LAB
RAD ONC ARIA COURSE ID: NORMAL
RAD ONC ARIA COURSE LAST TREATMENT DATE: NORMAL
RAD ONC ARIA COURSE START DATE: NORMAL
RAD ONC ARIA COURSE TREATMENT ELAPSED DAYS: 49
RAD ONC ARIA FIRST TREATMENT DATE: NORMAL
RAD ONC ARIA PLAN FRACTIONS TREATED TO DATE: 9
RAD ONC ARIA PLAN ID: NORMAL
RAD ONC ARIA PLAN PRESCRIBED DOSE PER FRACTION: 2 GY
RAD ONC ARIA PLAN PRIMARY REFERENCE POINT: NORMAL
RAD ONC ARIA PLAN TOTAL FRACTIONS PRESCRIBED: 12
RAD ONC ARIA PLAN TOTAL PRESCRIBED DOSE: 2400 CGY
RAD ONC ARIA REFERENCE POINT DOSAGE GIVEN TO DATE: 15.1 GY
RAD ONC ARIA REFERENCE POINT DOSAGE GIVEN TO DATE: 64 GY
RAD ONC ARIA REFERENCE POINT ID: NORMAL
RAD ONC ARIA REFERENCE POINT ID: NORMAL
RAD ONC ARIA REFERENCE POINT SESSION DOSAGE GIVEN: 0.13 GY
RAD ONC ARIA REFERENCE POINT SESSION DOSAGE GIVEN: 2 GY

## 2023-03-01 PROCEDURE — 77386: CPT | Performed by: RADIOLOGY

## 2023-03-02 ENCOUNTER — HOSPITAL ENCOUNTER (OUTPATIENT)
Dept: RADIATION ONCOLOGY | Facility: HOSPITAL | Age: 83
Discharge: HOME | End: 2023-03-02

## 2023-03-02 LAB
RAD ONC ARIA COURSE ID: NORMAL
RAD ONC ARIA COURSE LAST TREATMENT DATE: NORMAL
RAD ONC ARIA COURSE START DATE: NORMAL
RAD ONC ARIA COURSE TREATMENT ELAPSED DAYS: 50
RAD ONC ARIA FIRST TREATMENT DATE: NORMAL
RAD ONC ARIA PLAN FRACTIONS TREATED TO DATE: 10
RAD ONC ARIA PLAN ID: NORMAL
RAD ONC ARIA PLAN PRESCRIBED DOSE PER FRACTION: 2 GY
RAD ONC ARIA PLAN PRIMARY REFERENCE POINT: NORMAL
RAD ONC ARIA PLAN TOTAL FRACTIONS PRESCRIBED: 12
RAD ONC ARIA PLAN TOTAL PRESCRIBED DOSE: 2400 CGY
RAD ONC ARIA REFERENCE POINT DOSAGE GIVEN TO DATE: 15.24 GY
RAD ONC ARIA REFERENCE POINT DOSAGE GIVEN TO DATE: 66 GY
RAD ONC ARIA REFERENCE POINT ID: NORMAL
RAD ONC ARIA REFERENCE POINT ID: NORMAL
RAD ONC ARIA REFERENCE POINT SESSION DOSAGE GIVEN: 0.13 GY
RAD ONC ARIA REFERENCE POINT SESSION DOSAGE GIVEN: 2 GY

## 2023-03-02 PROCEDURE — 77336 RADIATION PHYSICS CONSULT: CPT | Performed by: RADIOLOGY

## 2023-03-02 PROCEDURE — 77386: CPT | Performed by: RADIOLOGY

## 2023-03-03 ENCOUNTER — HOSPITAL ENCOUNTER (OUTPATIENT)
Dept: RADIATION ONCOLOGY | Facility: HOSPITAL | Age: 83
Discharge: HOME | End: 2023-03-03

## 2023-03-03 LAB
RAD ONC ARIA COURSE ID: NORMAL
RAD ONC ARIA COURSE LAST TREATMENT DATE: NORMAL
RAD ONC ARIA COURSE START DATE: NORMAL
RAD ONC ARIA COURSE TREATMENT ELAPSED DAYS: 51
RAD ONC ARIA FIRST TREATMENT DATE: NORMAL
RAD ONC ARIA PLAN FRACTIONS TREATED TO DATE: 11
RAD ONC ARIA PLAN ID: NORMAL
RAD ONC ARIA PLAN PRESCRIBED DOSE PER FRACTION: 2 GY
RAD ONC ARIA PLAN PRIMARY REFERENCE POINT: NORMAL
RAD ONC ARIA PLAN TOTAL FRACTIONS PRESCRIBED: 12
RAD ONC ARIA PLAN TOTAL PRESCRIBED DOSE: 2400 CGY
RAD ONC ARIA REFERENCE POINT DOSAGE GIVEN TO DATE: 15.37 GY
RAD ONC ARIA REFERENCE POINT DOSAGE GIVEN TO DATE: 68 GY
RAD ONC ARIA REFERENCE POINT ID: NORMAL
RAD ONC ARIA REFERENCE POINT ID: NORMAL
RAD ONC ARIA REFERENCE POINT SESSION DOSAGE GIVEN: 0.13 GY
RAD ONC ARIA REFERENCE POINT SESSION DOSAGE GIVEN: 2 GY

## 2023-03-03 PROCEDURE — 77386: CPT | Performed by: RADIOLOGY

## 2023-03-06 ENCOUNTER — HOSPITAL ENCOUNTER (OUTPATIENT)
Dept: RADIATION ONCOLOGY | Facility: HOSPITAL | Age: 83
Discharge: HOME | End: 2023-03-06

## 2023-03-06 LAB
RAD ONC ARIA COURSE ID: NORMAL
RAD ONC ARIA COURSE LAST TREATMENT DATE: NORMAL
RAD ONC ARIA COURSE START DATE: NORMAL
RAD ONC ARIA COURSE TREATMENT ELAPSED DAYS: 54
RAD ONC ARIA FIRST TREATMENT DATE: NORMAL
RAD ONC ARIA PLAN FRACTIONS TREATED TO DATE: 12
RAD ONC ARIA PLAN ID: NORMAL
RAD ONC ARIA PLAN PRESCRIBED DOSE PER FRACTION: 2 GY
RAD ONC ARIA PLAN PRIMARY REFERENCE POINT: NORMAL
RAD ONC ARIA PLAN TOTAL FRACTIONS PRESCRIBED: 12
RAD ONC ARIA PLAN TOTAL PRESCRIBED DOSE: 2400 CGY
RAD ONC ARIA REFERENCE POINT DOSAGE GIVEN TO DATE: 15.5 GY
RAD ONC ARIA REFERENCE POINT DOSAGE GIVEN TO DATE: 70 GY
RAD ONC ARIA REFERENCE POINT ID: NORMAL
RAD ONC ARIA REFERENCE POINT ID: NORMAL
RAD ONC ARIA REFERENCE POINT SESSION DOSAGE GIVEN: 0.13 GY
RAD ONC ARIA REFERENCE POINT SESSION DOSAGE GIVEN: 2 GY

## 2023-03-06 PROCEDURE — 77386: CPT | Performed by: RADIOLOGY

## 2023-03-20 ENCOUNTER — APPOINTMENT (OUTPATIENT)
Dept: CT IMAGING | Facility: HOSPITAL | Age: 83
End: 2023-03-20
Payer: MEDICARE

## 2023-03-20 ENCOUNTER — APPOINTMENT (OUTPATIENT)
Dept: GENERAL RADIOLOGY | Facility: HOSPITAL | Age: 83
End: 2023-03-20
Payer: MEDICARE

## 2023-03-20 ENCOUNTER — HOSPITAL ENCOUNTER (EMERGENCY)
Facility: HOSPITAL | Age: 83
Discharge: HOME | End: 2023-03-20
Attending: EMERGENCY MEDICINE | Admitting: EMERGENCY MEDICINE
Payer: MEDICARE

## 2023-03-20 VITALS
DIASTOLIC BLOOD PRESSURE: 68 MMHG | WEIGHT: 162.8 LBS | RESPIRATION RATE: 16 BRPM | OXYGEN SATURATION: 96 % | HEART RATE: 80 BPM | TEMPERATURE: 98.4 F | SYSTOLIC BLOOD PRESSURE: 158 MMHG | HEIGHT: 66 IN | BODY MASS INDEX: 26.16 KG/M2

## 2023-03-20 DIAGNOSIS — S60.212A CONTUSION OF LEFT WRIST, INITIAL ENCOUNTER: ICD-10-CM

## 2023-03-20 DIAGNOSIS — M51.36 DDD (DEGENERATIVE DISC DISEASE), LUMBAR: ICD-10-CM

## 2023-03-20 DIAGNOSIS — S30.0XXA LUMBAR CONTUSION, INITIAL ENCOUNTER: ICD-10-CM
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
## 2023-03-20 PROCEDURE — 99284 EMERGENCY DEPT VISIT MOD MDM: CPT

## 2023-03-20 PROCEDURE — 70450 CT HEAD/BRAIN W/O DYE: CPT

## 2023-03-20 PROCEDURE — 25010000002 MORPHINE PER 10 MG: Performed by: EMERGENCY MEDICINE

## 2023-03-20 PROCEDURE — 72131 CT LUMBAR SPINE W/O DYE: CPT

## 2023-03-20 PROCEDURE — 73130 X-RAY EXAM OF HAND: CPT

## 2023-03-20 PROCEDURE — 73110 X-RAY EXAM OF WRIST: CPT

## 2023-03-20 PROCEDURE — 96372 THER/PROPH/DIAG INJ SC/IM: CPT

## 2023-03-20 RX ORDER — HYDROCODONE BITARTRATE AND ACETAMINOPHEN 5; 325 MG/1; MG/1
1 TABLET ORAL EVERY 8 HOURS PRN
Qty: 8 TABLET | Refills: 0 | Status: SHIPPED | OUTPATIENT
Start: 2023-03-20

## 2023-03-20 RX ADMIN — MORPHINE SULFATE 4 MG: 4 INJECTION, SOLUTION INTRAMUSCULAR; INTRAVENOUS at 08:04

## 2023-03-20 NOTE — ED PROVIDER NOTES
"Subjective   History of Present Illness  Patient is a 83-year-old female with a history of kidney stones who presents to the ER status post a fall.  Patient states her legs got tangled up in her 's legs last night and she fell backwards hitting her back on a closet door.  She did hit her head but denies any loss of consciousness.  She does not take blood thinners.  Patient states she has had left low back pain and left wrist pain since the fall.  She did take Tylenol prior to arrival.  She denies any fever, chest pain, shortness of air, abdominal pain, nausea vomiting diarrhea, urinary changes, neurologic changes, neck pain, other extremity pain.        Review of Systems   Constitutional: Negative.    HENT: Negative.    Eyes: Negative.    Respiratory: Negative.    Cardiovascular: Negative.    Gastrointestinal: Negative.    Endocrine: Negative.    Genitourinary: Negative.    Musculoskeletal: Positive for arthralgias, back pain and myalgias.   Skin: Negative.    Allergic/Immunologic: Negative.    Neurological: Negative.    Hematological: Negative.    Psychiatric/Behavioral: Negative.    All other systems reviewed and are negative.      Past Medical History:   Diagnosis Date   • Basal cell carcinoma (BCC) of jamari of right ear    • Idiopathic thrombocytopenic purpura (ITP) (HCC)    • Kidney stones        Allergies   Allergen Reactions   • Tramadol Other (See Comments)     Made her \"crazy\"       Past Surgical History:   Procedure Laterality Date   • APPENDECTOMY     • CHOLECYSTECTOMY     • SHOULDER SURGERY Left        History reviewed. No pertinent family history.    Social History     Socioeconomic History   • Marital status:    Tobacco Use   • Smoking status: Never   • Smokeless tobacco: Never   Vaping Use   • Vaping Use: Never used   Substance and Sexual Activity   • Alcohol use: Never   • Drug use: Never           Objective   Physical Exam  Vitals and nursing note reviewed.   Constitutional:       " Appearance: She is well-developed.   HENT:      Head: Normocephalic and atraumatic.   Eyes:      Conjunctiva/sclera: Conjunctivae normal.      Pupils: Pupils are equal, round, and reactive to light.   Cardiovascular:      Rate and Rhythm: Normal rate and regular rhythm.      Heart sounds: Normal heart sounds.   Pulmonary:      Effort: Pulmonary effort is normal.      Breath sounds: Normal breath sounds.   Abdominal:      Palpations: Abdomen is soft.      Tenderness: There is no abdominal tenderness.   Musculoskeletal:         General: No deformity. Normal range of motion.      Cervical back: Normal range of motion.      Comments: Tender to palpation left lower lumbar paraspinal muscles and lower lumbar spine, tender to palpation dorsal aspect of the left wrist with ecchymosis, no scaphoid tenderness, no obvious deformity, nontender to palpation elsewhere including C and T spines and other extremities, normal range of motion, neurovascular intact, pulses 2+ throughout   Skin:     General: Skin is warm.   Neurological:      Mental Status: She is alert and oriented to person, place, and time.      Sensory: Sensation is intact.      Motor: Motor function is intact.   Psychiatric:         Behavior: Behavior normal.         Procedures           ED Course           Lab Results (last 24 hours)     ** No results found for the last 24 hours. **        CT Head Without Contrast   Final Result       No acute intracranial findings.   This report was finalized on 03/20/2023 09:10 by Dr. Alin Farah MD.      CT Lumbar Spine Without Contrast   Final Result   1. No acute fracture is identified, there are multiple stable   compression fractures in the lower thoracic and lumbar spine, all appear   stable compared with the abdomen CT from 12/5/2022. Multilevel   degenerative changes are also present as described above.                   This report was finalized on 03/20/2023 09:17 by Dr. Andrey Ogden MD.      XR Hand 3+ View Left    Final Result   1. No acute fracture or dislocation.   2. A moderate diffuse osteopenia.           This report was finalized on 03/20/2023 09:21 by Dr. Nicolette Chu MD.      XR Wrist 3+ View Left   Final Result   No fracture deformity of the distal radial metaphysis and   there is at least body distal to the ulna compatible with previous   trauma. No acute fracture is identified. There is diffuse osteopenia.   This report was finalized on 03/20/2023 09:07 by Dr. Andrey Ogden MD.                              RADHA reviewed by Malgorzata Saldana MD       Medical Decision Making  Patient is a 83-year-old female with a history of kidney stones who presents to the ER status post a fall.  Patient states her legs got tangled up in her 's legs last night and she fell backwards hitting her back on a closet door.  She did hit her head but denies any loss of consciousness.  She does not take blood thinners.  Patient states she has had left low back pain and left wrist pain since the fall.  She did take Tylenol prior to arrival.  She denies any fever, chest pain, shortness of air, abdominal pain, nausea vomiting diarrhea, urinary changes, neurologic changes, neck pain, other extremity pain.    Differential diagnosis: Lumbar strain, lumbar fracture, wrist sprain, wrist contusion    Patient was given morphine.  Pain improving with treatment.  CT scan of the head showed no acute findings.  CT scan of the L-spine showed no acute fractures.  There are multiple stable compression fractures in the lower thoracic and lumbar spines but all appeared stable when compared to previous CT.  X-rays of the left hand and wrist showed no acute fracture or dislocation.  Patient does have moderate diffuse osteopenia.  Work-up consistent with degenerative disc disease of the lumbar spine and a left wrist contusion.  Patient did request pain medication.  She will be discharged home with a very short course of Lortab for pain control.   She is to follow-up with her PCP and is return to the ER immediately for any worsening or new pain or other concerns.  Patient and family agreeable to plan.     Patient is 18 or older, presenting with minor blunt head trauma. Head CT (including cosigned orders) was ordered  by an emergency care clinician for trauma because patient is 65 or older.        Contusion of left wrist, initial encounter: acute illness or injury  DDD (degenerative disc disease), lumbar: chronic illness or injury  Fall, initial encounter: acute illness or injury  Lumbar contusion, initial encounter: acute illness or injury  Amount and/or Complexity of Data Reviewed  Radiology: ordered. Decision-making details documented in ED Course.      Risk  Prescription drug management.          Final diagnoses:   Fall, initial encounter   Lumbar contusion, initial encounter   DDD (degenerative disc disease), lumbar   Contusion of left wrist, initial encounter       ED Disposition  ED Disposition     ED Disposition   Discharge    Condition   Good    Comment   --             Marvin Vasques Jr., MD  [address] [place] [id number] [phone]    Schedule an appointment as soon as possible for a visit            Medication List      New Prescriptions    HYDROcodone-acetaminophen 5-325 MG per tablet  Commonly known as: NORCO  Take 1 tablet by mouth Every 8 (Eight) Hours As Needed for Moderate Pain.           Where to Get Your Medications      These medications were sent to [facility] [address] [place] [address] [address] - [phone] [facility] [phone] [place] [id number]    Phone: [phone]   · HYDROcodone-acetaminophen 5-325 MG per tablet          Malgorzata Saldana MD  03/20/23 1231       Malgorzata Saldana MD  03/20/23 9532

## 2023-03-31 ENCOUNTER — TELEPHONE (OUTPATIENT)
Dept: OTOLARYNGOLOGY | Facility: CLINIC | Age: 83
End: 2023-03-31

## 2023-03-31 NOTE — TELEPHONE ENCOUNTER
Provider: TIMOTEO ALBERTO    Caller: CONRAD    Relationship to Patient: GRANDDAUGHTER     Phone Number: [phone]    Reason for Call: RESCHEDULE    When was the patient last seen: 11/14/22    Notes: PATIENT IS UNABLE TO MAKE TODAY'S APPOINTMENT (03/31/23 @ 9:30 AM) DUE TO HER BACK HURTING. PATIENT FELL A COUPLE OF WEEKS AGO AND IS STILL HURTING. MOVED APPOINTMENT TO NEXT AVAILABLE, 06/19/23 @ 2:30 PM WITH TIMOTEO. ALSO ADDED PATIENT TO WAIT LIST.

## 2023-04-19 PROBLEM — C44.211 BASAL CELL CARCINOMA (BCC) OF SKIN OF EAR: Status: ACTIVE | Noted: 2023-04-19

## 2023-04-19 PROBLEM — Z92.3 HISTORY OF RADIATION THERAPY: Status: ACTIVE | Noted: 2023-04-19

## 2023-04-19 NOTE — PROGRESS NOTES
[facility], .SShine[facility] Marshall Sanchez APRN  ____________________________________________________________  [facility]  Department of Radiation Oncology  [address] [place] [id number]  Office:  [phone]  Fax: [phone]    DATE:  04/20/2023  PATIENT: Rosanna Chaparro  1940                         MEDICAL RECORD #:  [id number]                 Chief Complaint   Patient presents with   • BCC of right each jamari     Reason for Visit: Rosanna Chaparro is a very pleasant 83 y.o. female that completed radiation therapy to the right ear and returns to the clinic today for initial follow up exam. Reports fatigue, unexpected weight change, and hearing loss. Denies trouble swallowing, cough, SOB, chest pain, dizziness, nasuea/vomiting, diarrhea, light-headedness, weakness, and headaches. She continues to follow .     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 She completed 7000 cGy in 35 fractions to the right ear on 03/06/2023.     09/01/2022 - Appointment with Timmy Nunes APRN - Otolaryngology:  • She complains of a lesionright ear present for 1 year(s)  • Patient states she has had a lesion to her right ear for the past year that has progressively gotten larger. She states she had a blistered area a couple days ago that started draining.   Plan:  • ct temporal bones prior to follow up   • Follow up in 4 weeks and we will discuss excision of lesion   • Follow up in about 4 weeks (around 9/29/2022).     09/23/2022 - CT Temporal Bones due to hearing loss:  • Abnormal skin thickening and enhancement at the right acoustic meatus. Recommend clinical correlation/visual inspection in this area. The right external canal is near completely opacified with soft tissue debris. I do not see destructive bony change along  the external canal.  • Middle ear cavities and mastoids are clear. Ossicular chains are intact.  • Nearly dehiscent superior semicircular canals, bilateral.    09/27/2022 - Appointment with Timmy Nunes APRN - Otolaryngology:  • Discussed surgical removal options vs medical management with patient. She would like to proceed with referral to Dr. Ha for treatment with Eriveage.  • Dr. Cao examined and discussed care with patient and agrees with treatment plan.   • Return in about 6 months (around 3/27/2023) for Recheck.    10/04/2022 - Documentation per Otolaryngology group:  • Patient needs biospy of lesion per Dr. Mock before he sees patient and treats with Shola    11/03/2022 - Punch Bx Right Mady per :  • Basal cell carcinoma, surrounded by scar tissue  • (Shave specimen of superficial skin)    11/28/2022 - Appointment with :  Assessment:  • Basal cell carcinoma of the right ear acoustic meatus  ? High risk of recurrence (lesion on head and neck).  Not felt to be excisional candidate due to locally advanced lesion.  ? Up-to-date algorithm for nonsurgical candidates recommends primary radiation therapy (preferred) or hedgehog pathway inhibitor (preferred) or immune checkpoint inhibitor (cemiplimab).  Clinical follow-up for current every 6-12 months.  • History of thrombocytopenia (ITP versus MDS)  o --Previously followed by Dr. Beth Richards, [facility] for chronic thrombocytopenia.  Question ITP vs MDS.  Last seen in office by Dr. Richards, 07/08/2017.  At that time platelets have been stable for the past few years without active bleeding.  Platelet count was 23,000,.  To a baseline of 30,000.  No aggressive treatments were pursued.  Previous bone marrow biopsy, 11/05/2012 showed hypercellular marrow showing maturing trilineage hematopoiesis with mild dyserythropoiesis and mild megakaryocytic atypia.  No increase in blasts.    • Diabetes  mellitus.  • Advanced age  Plan:  • Apprised of the available diagnostic information.  • Draw baseline CBC with differential, iron, iron saturation, ferritin, B12, folate, CMP  • Refer to Dr. Taylor Re: Assess for definitive radiation  • Teaching sheets for Erivedge (vismodegib).  Potential toxicities discussed (to include but not limited to: Liver injury, azotemia, hyponatremia, hypokalemia, Andres-Gulshan syndrome, toxic epidermal necrolysis, muscle spasms, alopecia, dysgeusia, weight loss, fatigue, nausea, amenorrhea, diarrhea, decreased appetite, constipation, arthralgia, vomiting, loss of taste, elevated CK, azotemia.  • Schedule CT head, soft tissues of the neck, chest, abdomen/pelvis with p.o. and IV contrast.    • Continue management per PCP and other specialists  • Return to office in 4 weeks.  Further management recommendations pending.    12/05/2022 - CT Soft Tissue Neck with contrast:  • 2.0 x 1.8 x 1.2 cm enhancing skin lesion involving the jamari of the right ear. Up to 1.1 cm depth of invasion with the deep margin essentially abutting the bony elements of the external auditory canal along the roof and floor of the external canal.   • There is also a 7 mm depth of invasion inferiorly, with the lesion abutting the capsule of the superficial lobe of the right parotid gland.   • There is a 7 mm hyperenhancing lymph node in the upper level 2B neck. This is not technically enlarged by size criteria but this does appear to be hyperenhancing relative to the other cervical nodes and I'm suspicious that this is a leyda metastasis.    12/05/2022 - CT Chest with contrast:  • Rather benign-appearing subpleural pulmonary nodule in the right lower lobe which could be followed up on subsequent imaging. There is mild basilar atelectasis as well as linear scarring or atelectasis in the left upper lobe. No evidence of consolidative pneumonia.  • Heavy coronary calcifications are present. There is mild cardiomegaly. The  thoracic aorta and great vessels are ectatic.  • No convincing evidence of metastatic disease to the thorax.  • Hiatal hernia. There is a cortical cyst of the upper pole of the left kidney.    12/05/2022 - CT Abdomen/Pelvis with contrast:  • No radiographic evidence of metastatic disease to the abdomen or pelvis.  • Multiple compression deformities in the thoracic and lumbar spine. The fracture at L3 is more acute to subacute in appearance and should be correlated clinically. There is an accentuated thoracic kyphosis with mild gibbus deformity with the thoracolumbar juncture.  • Cortical cyst upper pole left kidney. No evidence of nephrolithiasis or obstructive uropathy.  • Moderate hiatal hernia.  • Heavy atheromatous calcification of the abdominal aorta and iliac arteries with no evidence of aneurysm.    12/05/2022 - CT Head with and without contrast:  • Age-appropriate atrophy.    12/06/2022 -Documentation per :  • Pls confirm that pt has a referral to dr bonilla or dr sr of [facility]    12/09/2022 - Consult with  (Covering for ):  • Patient is considering treatment options that have been offered to her including surgical resection and systemic therapy with vismodegib.  She presents today for consultation regarding radiotherapy options.  • We have discussed the indications and rationale of radiation therapy according to the NCCN Guidelines.  Given a prominent ipsilateral neck lymph node that measures 7 mm with hyperintensity on CT scan, I discussed possibility of obtaining a biopsy or proceeding with PET scan to rule out regional leyda spread.  I do believe definitive intent radiotherapy will be a good option for her but she is also considering surgical and systemic therapy options.  I have extensively reviewed the risks, benefits and alternatives of therapy and progression of disease in spite of therapy with either local or systemic failure.   I have seen, examined and reviewed her  medication list, appropriate labs and imaging studies as well as other physician notes. We discussed the goals/plans of care and answered all questions.   • In summary, 3 options were presented to her which included needle biopsy of a prominent ipsilateral neck lymph node, which of positive may lend itself to additional treatment recommendations, versus PET scan staging, versus proceeding with definitive intent radiotherapy to include primary lesion and suspicious ipsilateral lymphatic region.  I explained the intent, benefits, course, precautions, and side effects of treatment which include worsening skin reactions including erythema and skin desquamation, radiation-induced hearing changes, radiation-induced salivary gland dysfunction which can lead to xerostomia, patchy alopecia, fatigue and possibly ipsilateral oropharyngeal mucositis.  Their questions were answered to their satisfaction and at the end of our discussion patient and her granddaughter wished to discuss the totality of treatment options presented to them with her family and call back with a decision.  • If patient wishes to proceed with definitive intent radiotherapy, anticipate treating to a dose of 7000 cGy to be given over 35 daily fractions (Monday through Friday).  We will await patient call back with a decision.  In the meantime, she was asked to follow-up with her other healthcare providers for routine medical care as per their scheduling.    12/14/2022 - Documentation per  (Covering for ):  • Patient called back to schedule CT simulation wishing to move forward with definitive intent radiotherapy as previously discussed.  They declined to pursue additional work-up by way of PET scan or lymph node biopsy wanting to only pursue treatment at this time.  PLAN:  • We will proceed with CT simulation today in anticipation of definitive intent radiotherapy to a dose of 7000 cGy to be given over 35 daily fractions and will make  efforts to include the ipsilateral prominent lymph node previously discussed..    01/11/2023 - 03/06/2023 - Completed radiation course:  • Received 7000 cGy in 35 fractions to the right ear.    01/13/2023 - PET scan:  • 2.2 x 1.1 cm hypermetabolic soft tissue thickening in the RIGHT external auditory canal, correlating with biopsy-proven basal cell carcinoma.  • No evidence of hypermetabolic metastatic disease in the head, neck, chest, abdomen, pelvis, or bilateral lower extremities.  • Moderate size hiatal hernia.    02/24/2023 - Appointment with :  Assessment:  • Basal cell carcinoma of the right ear acoustic meatus  ? Tumor stage: cT1, cN0   ? Tumor burden/complications:  2 cm tumor present at the acoustic meatus causing decreased unilateral hearing with up to 1.1 cm depth of invasion with the deep margin essentially abutting the bony elements of the external auditory canal along the roof and floor of the external canaliorly, with the lesion abutting the capsule of the superficial lobe of the right parotid gland. There is a 7 mm hyperenhancing lymph node in the upper level 2B neck.  Per radiology this is not technically enlarged by size criteria but this does appear to be hyperenhancing relative to the other cervical nodes suspicious for leyda metastasis  ? High risk of recurrence (lesion on head and neck).  Not felt to be excisional candidate due to locally advanced lesion.  ? Up-to-date algorithm for nonsurgical candidates recommends primary radiation therapy (preferred) or hedgehog pathway inhibitor (preferred) or immune checkpoint inhibitor (cemiplimab).  Clinical follow-up for recurrence every 6-12 months.  o --01/13/2023- PET scan:  1.  2.2 x 1.1 cm hypermetabolic soft tissue thickening in the RIGHT external auditory canal, correlating with biopsy-proven basal cell carcinoma. 2.  No evidence of hypermetabolic metastatic disease in the head, neck, chest, abdomen, pelvis, or bilateral lower  "extremities. 3.  Moderate size hiatal hernia  ? 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 Beth Richards, [facility] for chronic thrombocytopenia.  Question ITP vs MDS.  Last seen in office by Dr. Richards, 07/08/2017.  At that time platelets have been stable for the past few years without active bleeding.  Platelet count was 23,000,.  To a baseline of 30,000.  No aggressive treatments were pursued.  Previous bone marrow biopsy, 11/05/2012 showed hypercellular marrow showing maturing trilineage hematopoiesis with mild dyserythropoiesis and mild megakaryocytic atypia.  No increase in blasts.    o --11/28/2022- platelets 48,000 (ramon: 23,000 per records)  • Diabetes mellitus.  • Advanced age  Plan:  • Apprised of the PET scan, 1/13/2023 (above).  No mets  • Continue definitive intent radiotherapy (anticipate 7000 cGy in 35 daily fractions - 1/11-present- 27/35 fx)  • Erivedge (vismodegib).  Potential toxicities again discussed (to include but not limited to: Liver injury, azotemia, hyponatremia, hypokalemia, Andres-Gulshan syndrome, toxic epidermal necrolysis, muscle spasms, alopecia, dysgeusia, weight loss, fatigue, nausea, amenorrhea, diarrhea, decreased appetite, constipation, arthralgia, vomiting, loss of taste, elevated CK, azotemia.    • Discussed that if PET scan does not show metastatic disease and if disease can be encompassed by definitive RT, given patient's advanced age and comorbidities, will consider withholding systemic therapy until she develops more widespread \"measurable disease\".  On the other hand, if PET scan shows metastatic disease, will consider systemic therapy up-front    • Continue management per PCP and other specialists  • Schedule whole body PET scan in 23 weeks  • Return to office in 24 weeks with preoffice CMP and CBC w diff.  "       History obtained from  PATIENT, FAMILY, and CHART    PAST MEDICAL HISTORY  Past Medical History:   Diagnosis Date   • Basal cell carcinoma (BCC) of jmaari of right ear    • Idiopathic thrombocytopenic purpura (ITP)    • Kidney stones       PAST SURGICAL HISTORY  Past Surgical History:   Procedure Laterality Date   • APPENDECTOMY     • CHOLECYSTECTOMY     • SHOULDER SURGERY Left       FAMILY HISTORY  family history is not on file.     SOCIAL HISTORY  Social History     Tobacco Use   • Smoking status: Never   • Smokeless tobacco: Never   Vaping Use   • Vaping Use: Never used   Substance Use Topics   • Alcohol use: Never   • Drug use: Never     ALLERGIES  Tramadol     MEDICATIONS    Current Outpatient Medications:   •  ascorbic acid (VITAMIN C) 1000 MG tablet, Take 1 tablet by mouth Daily., Disp: , Rfl:   •  CALCIUM PO, Take 1 tablet by mouth Daily., Disp: , Rfl:   •  CRANBERRY PO, Take 1 tablet by mouth Daily., Disp: , Rfl:   •  Multiple Vitamins-Minerals (EMERGEN-C IMMUNE PLUS PO), Take 1 tablet by mouth Daily., Disp: , Rfl:   •  Vitamin D, Cholecalciferol, (CHOLECALCIFEROL) 10 MCG (400 UNIT) tablet, Take 1 tablet by mouth Daily., Disp: , Rfl:   •  HYDROcodone-acetaminophen (NORCO) 5-325 MG per tablet, Take 1 tablet by mouth Every 8 (Eight) Hours As Needed for Moderate Pain., Disp: 8 tablet, Rfl: 0    Current outpatient and discharge medications have been reconciled for the patient.  Reviewed by: PRATIBHA Dick    The following portions of the patient's history were reviewed and updated as appropriate: allergies, current medications, past family history, past medical history, past social history, past surgical history and problem list.    REVIEW OF SYSTEMS  Review of Systems   Constitutional: Positive for fatigue and unexpected weight change.   HENT: Positive for hearing loss (right ear hearing difficulty; some yellow drainage. ). Negative for trouble swallowing.    Respiratory: Negative.  Negative for  "cough and shortness of breath.    Cardiovascular: Negative.  Negative for chest pain.   Gastrointestinal: Negative.    Genitourinary: Negative.    Musculoskeletal: Negative.    Skin: Negative.    Neurological: Negative.  Negative for dizziness and weakness.   Hematological: Negative.  Negative for adenopathy.   Psychiatric/Behavioral: Negative.    All other systems reviewed and are negative.    I have reviewed and confirmed the accuracy of the ROS as documented by the MA/FELICIANO/PRATIBHA Fagan    PHYSICAL EXAM  VITAL SIGNS:   Vitals:    04/20/23 1327   BP: 103/44   Pulse: 92   Resp: 18   SpO2: 97%   Weight: 68 kg (150 lb)   Height: 167.6 cm (66\")   PainSc: 0-No pain   PainLoc: Comment: Fall in MArch  Still with pain from previous compression fractures      Physical Exam  Vitals reviewed.   Constitutional:       Appearance: Normal appearance.   HENT:      Head: Normocephalic.      Right Ear: Drainage present.      Ears:      Comments: Right ear: acoustic meatus s/p radiation therapy; decreased hearing noted; minimal yellow drainage noted, no evidence of tumor recurrence.  Cardiovascular:      Rate and Rhythm: Normal rate and regular rhythm.      Pulses: Normal pulses.      Heart sounds: Normal heart sounds.   Pulmonary:      Effort: Pulmonary effort is normal. No respiratory distress.      Breath sounds: Normal breath sounds.   Abdominal:      General: Bowel sounds are normal.   Musculoskeletal:         General: Normal range of motion.      Cervical back: Normal range of motion and neck supple.   Lymphadenopathy:      Cervical: No cervical adenopathy.   Skin:     General: Skin is warm.      Capillary Refill: Capillary refill takes less than 2 seconds.   Neurological:      General: No focal deficit present.      Mental Status: She is alert and oriented to person, place, and time.   Psychiatric:         Mood and Affect: Mood normal.         Behavior: Behavior normal.         Performance Status: ECOG (1) Restricted " in physically strenuous activity, ambulatory and able to do work of light nature    Clinical Quality Measures  -Pain Documented by Standardized Tool, FPS Rosanna Chaparro reports a pain score of 0. Given her pain assessment as noted, treatment options were discussed and the following options were decided upon as a follow-up plan to address the patient's pain: No pain, no plan given.   Pain Medications             HYDROcodone-acetaminophen (NORCO) 5-325 MG per tablet Take 1 tablet by mouth Every 8 (Eight) Hours As Needed for Moderate Pain.        -Advanced Care Planning Advance Care Planning  ACP discussion was held with the patient during this visit. Patient does not have an advance directive, information provided.     -Body Mass Index Screening and Follow-Up Plan BMI is within normal parameters. No other follow-up for BMI required.    -Tobacco Use: Screening and Cessation Intervention Social History    Tobacco Use      Smoking status: Never      Smokeless tobacco: Never     ASSESSMENT AND PLAN  1. 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 History of radiation therapy    3. Non-smoker      RECOMMENDATIONS:    Rosanna Chaparro is status post completion of radiation therapy to right ear and presents to our clinic today for inital follow up exam. 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 She completed 7000 cGy in 35 fractions to the right ear on 03/06/2023.     I have reviewed the chart and other physicians records. she denies untoward side effects from treatment and without evidence for recurrent or metastatic disease on exam today. She will return in 3 months for follow up. Continue ongoing management per primary care physician and other specialists.    Patient Instructions   1) Return in 3 months     Marshall Wood, PRATIBHA  04/20/2023

## 2023-04-20 ENCOUNTER — OFFICE VISIT (OUTPATIENT)
Dept: RADIATION ONCOLOGY | Facility: HOSPITAL | Age: 83
End: 2023-04-20
Payer: MEDICARE

## 2023-04-20 ENCOUNTER — HOSPITAL ENCOUNTER (OUTPATIENT)
Dept: RADIATION ONCOLOGY | Facility: HOSPITAL | Age: 83
Setting detail: RADIATION/ONCOLOGY SERIES
End: 2023-04-20
Payer: MEDICARE

## 2023-04-20 VITALS
HEART RATE: 92 BPM | HEIGHT: 66 IN | RESPIRATION RATE: 18 BRPM | WEIGHT: 150 LBS | SYSTOLIC BLOOD PRESSURE: 103 MMHG | BODY MASS INDEX: 24.11 KG/M2 | OXYGEN SATURATION: 97 % | DIASTOLIC BLOOD PRESSURE: 44 MMHG

## 2023-04-20 DIAGNOSIS — Z78.9 NON-SMOKER: ICD-10-CM

## 2023-04-20 DIAGNOSIS — C44.211 BASAL CELL CARCINOMA (BCC) OF SKIN OF EAR, UNSPECIFIED LATERALITY: Primary | ICD-10-CM

## 2023-04-20 DIAGNOSIS — Z92.3 HISTORY OF RADIATION THERAPY: ICD-10-CM

## 2023-04-20 PROCEDURE — G0463 HOSPITAL OUTPT CLINIC VISIT: HCPCS | Performed by: RADIOLOGY

## 2023-06-19 ENCOUNTER — OFFICE VISIT (OUTPATIENT)
Dept: OTOLARYNGOLOGY | Facility: CLINIC | Age: 83
End: 2023-06-19
Payer: MEDICARE

## 2023-06-19 VITALS
RESPIRATION RATE: 16 BRPM | SYSTOLIC BLOOD PRESSURE: 80 MMHG | HEIGHT: 66 IN | HEART RATE: 92 BPM | BODY MASS INDEX: 24.11 KG/M2 | TEMPERATURE: 97.5 F | WEIGHT: 150 LBS | DIASTOLIC BLOOD PRESSURE: 62 MMHG

## 2023-06-19 DIAGNOSIS — Z92.3 HISTORY OF RADIATION THERAPY: ICD-10-CM

## 2023-06-19 DIAGNOSIS — C44.212: Primary | ICD-10-CM

## 2023-06-19 DIAGNOSIS — H91.93 BILATERAL HEARING LOSS, UNSPECIFIED HEARING LOSS TYPE: ICD-10-CM

## 2023-06-19 DIAGNOSIS — H92.11 OTORRHEA OF RIGHT EAR: ICD-10-CM

## 2023-06-19 DIAGNOSIS — D48.5 NEOPLASM OF UNCERTAIN BEHAVIOR OF EAR: ICD-10-CM

## 2023-06-19 PROCEDURE — 1160F RVW MEDS BY RX/DR IN RCRD: CPT | Performed by: NURSE PRACTITIONER

## 2023-06-19 PROCEDURE — 99213 OFFICE O/P EST LOW 20 MIN: CPT | Performed by: NURSE PRACTITIONER

## 2023-06-19 PROCEDURE — 1159F MED LIST DOCD IN RCRD: CPT | Performed by: NURSE PRACTITIONER

## 2023-06-19 RX ORDER — ACETAMINOPHEN 500 MG
500 TABLET ORAL EVERY 6 HOURS PRN
COMMUNITY

## 2023-06-19 RX ORDER — CIPROFLOXACIN AND DEXAMETHASONE 3; 1 MG/ML; MG/ML
3 SUSPENSION/ DROPS AURICULAR (OTIC) 4 TIMES DAILY
Qty: 7.5 ML | Refills: 0 | Status: SHIPPED | OUTPATIENT
Start: 2023-06-19 | End: 2023-06-20

## 2023-06-19 RX ORDER — IBUPROFEN 200 MG
200 TABLET ORAL EVERY 6 HOURS PRN
COMMUNITY

## 2023-06-19 NOTE — PROGRESS NOTES
YOB: 1940  Location: [facility]  Location Address: [address] [place] [address], [facility], [address] [place] [address]  Location Phone: [phone]    Chief Complaint   Patient presents with    Basal Cell Carcinoma       History of Present Illness  Rosanna Chaparro is a 83 y.o. female.  Rosanna Chaparro is here for follow up of ENT complaints. The patient is s/p basal cell carcinoma excision of the right ear on 11/3/2022. She has had a relatively normal postoperative course. She denies any evidence of recurrence. She reports ear drainage from the right ear. She completed 7000 cGy in 35 fractions of radiation to the right ear on 3/6/2023.   She has a PET scan scheduled in August.     Past Medical History:   Diagnosis Date    Basal cell carcinoma (BCC) of jamari of right ear     Idiopathic thrombocytopenic purpura (ITP)     Kidney stones        Past Surgical History:   Procedure Laterality Date    APPENDECTOMY      CHOLECYSTECTOMY      SHOULDER SURGERY Left        Outpatient Medications Marked as Taking for the 23 encounter (Office Visit) with Timmy Nunes APRN   Medication Sig Dispense Refill    acetaminophen (TYLENOL) 500 MG tablet Take 1 tablet by mouth Every 6 (Six) Hours As Needed for Mild Pain.      ascorbic acid (VITAMIN C) 1000 MG tablet Take 1 tablet by mouth Daily.      CALCIUM PO Take 1 tablet by mouth Daily.      CRANBERRY PO Take 1 tablet by mouth Daily.      ibuprofen (ADVIL,MOTRIN) 200 MG tablet Take 1 tablet by mouth Every 6 (Six) Hours As Needed for Mild Pain.      Multiple Vitamins-Minerals (EMERGEN-C IMMUNE PLUS PO) Take 1 tablet by mouth Daily.      Vitamin D, Cholecalciferol, (CHOLECALCIFEROL) 10 MCG (400 UNIT) tablet Take 1 tablet by mouth Daily.         Tramadol    History reviewed. No pertinent family history.    Social History     Socioeconomic History    Marital status:    Tobacco Use    Smoking status: Never    Smokeless tobacco: Never   Vaping Use    Vaping  Use: Never used   Substance and Sexual Activity    Alcohol use: Never    Drug use: Never       Review of Systems   Constitutional: Negative.    HENT:  Positive for ear discharge.    Respiratory: Negative.     Cardiovascular: Negative.    Gastrointestinal: Negative.    Genitourinary: Negative.    Skin: Negative.    Neurological: Negative.      Vitals:    06/19/23 1427   BP: (!) 80/62   Pulse: 92   Resp: 16   Temp: 97.5 °F (36.4 °C)       Body mass index is 24.21 kg/m².    Objective     Physical Exam  Vitals reviewed.   Constitutional:       Appearance: Normal appearance.   HENT:      Head: Normocephalic and atraumatic.      Right Ear: Decreased hearing noted. Drainage present.      Left Ear: Tympanic membrane, ear canal and external ear normal. Decreased hearing noted.      Nose: Nose normal.      Mouth/Throat:      Lips: Pink.      Mouth: Mucous membranes are moist.      Pharynx: Oropharynx is clear. Uvula midline.   Musculoskeletal:      Cervical back: Full passive range of motion without pain.   Neurological:      Mental Status: She is alert.   Psychiatric:         Behavior: Behavior is cooperative.       Assessment & Plan   Problems Addressed this Visit          Hematology and Neoplasia    Neoplasm of uncertain behavior of ear    History of radiation therapy     Other Visit Diagnoses       Basal cell carcinoma (BCC) of jamari of right ear    -  Primary    Bilateral hearing loss, unspecified hearing loss type        Otorrhea of right ear              Diagnoses         Codes Comments    Basal cell carcinoma (BCC) of jamari of right ear    -  Primary ICD-10-CM: C44.212  ICD-9-CM: 173.21     Neoplasm of uncertain behavior of ear     ICD-10-CM: D48.5  ICD-9-CM: 238.2     Bilateral hearing loss, unspecified hearing loss type     ICD-10-CM: H91.93  ICD-9-CM: 389.9     Otorrhea of right ear     ICD-10-CM: H92.11  ICD-9-CM: 388.60     History of radiation therapy     ICD-10-CM: Z92.3  ICD-9-CM: V15.3           * Surgery  not found *  No orders of the defined types were placed in this encounter.    Defers hearing test  Ciprodex to the right ear  Will see back in 3 months after PET scan  Continue follow ups with Dr. Taylor and Dr. Mock   Return for problems    Return in about 3 months (around 2023) for Recheck with Dr. Cao.       Patient Instructions   Defers hearing test  Ciprodex to the right ear  Will see back in 3 months after PET scan  Continue follow ups with Dr. Taylor and Dr. Mock   Return for problems    CONTACT INFORMATION:  The main office phone number is [phone]. For emergencies after hours and on weekends, this number will convert over to our answering service and the on call provider will answer. Please try to keep non emergent phone calls/ questions to office hours 9am-5pm Monday through Friday.      [employer]  As an alternative, you can sign up and use the Epic MyChart system for more direct and quicker access for non emergent questions/ problems.  [employer] allows you to send messages to your doctor, view your test results, renew your prescriptions, schedule appointments, and more. To sign up, go to [employer] and click on the Sign Up Now link in the New User? box. Enter your [employer] Activation Code exactly as it appears below along with the last four digits of your Social Security Number and your Date of Birth () to complete the sign-up process. If you do not sign up before the expiration date, you must request a new code.     [employer] Activation Code: Activation code not generated  Current [employer] Status: Active     If you have questions, you can email [email or web address] or call [phone] to talk to our [employer] staff. Remember, [employer] is NOT to be used for urgent needs. For medical emergencies, dial 911.     IF YOU SMOKE OR USE TOBACCO PLEASE READ THE FOLLOWING:  Why is smoking bad for me?  Smoking increases the risk of heart disease, lung disease,  vascular disease, stroke, and cancer. If you smoke, STOP!        IF YOU SMOKE OR USE TOBACCO PLEASE READ THE FOLLOWING:  Why is smoking bad for me?  Smoking increases the risk of heart disease, lung disease, vascular disease, stroke, and cancer. If you smoke, STOP!     For more information:  Quit Now [place]  1-800-QUIT-NOW  https://[place].quitlogix.org/en-[place]/

## 2023-06-19 NOTE — PATIENT INSTRUCTIONS
Defers hearing test  Ciprodex to the right ear  Will see back in 3 months after PET scan  Continue follow ups with Dr. Taylor and Dr. Mock   Return for problems    CONTACT INFORMATION:  The main office phone number is [phone]. For emergencies after hours and on weekends, this number will convert over to our answering service and the on call provider will answer. Please try to keep non emergent phone calls/ questions to office hours 9am-5pm Monday through Friday.      [employer]  As an alternative, you can sign up and use the Epic MyChart system for more direct and quicker access for non emergent questions/ problems.  [facility] [employer] allows you to send messages to your doctor, view your test results, renew your prescriptions, schedule appointments, and more. To sign up, go to [employer] and click on the Sign Up Now link in the New User? box. Enter your [employer] Activation Code exactly as it appears below along with the last four digits of your Social Security Number and your Date of Birth () to complete the sign-up process. If you do not sign up before the expiration date, you must request a new code.     [employer] Activation Code: Activation code not generated  Current [employer] Status: Active     If you have questions, you can email [email or web address] or call [phone] to talk to our [employer] staff. Remember, [employer] is NOT to be used for urgent needs. For medical emergencies, dial 911.     IF YOU SMOKE OR USE TOBACCO PLEASE READ THE FOLLOWING:  Why is smoking bad for me?  Smoking increases the risk of heart disease, lung disease, vascular disease, stroke, and cancer. If you smoke, STOP!        IF YOU SMOKE OR USE TOBACCO PLEASE READ THE FOLLOWING:  Why is smoking bad for me?  Smoking increases the risk of heart disease, lung disease, vascular disease, stroke, and cancer. If you smoke, STOP!     For more information:  Quit Now  [place]  1-800-QUIT-NOW  https://[place].quitlogix.org/en-[place]/

## 2023-06-21 ENCOUNTER — TELEPHONE (OUTPATIENT)
Dept: OTOLARYNGOLOGY | Facility: CLINIC | Age: 83
End: 2023-06-21

## 2023-06-21 NOTE — TELEPHONE ENCOUNTER
The [facility] received a fax that requires your attention. The document has been indexed to the patient’s chart for your review.      Reason for sending: NEED PROVIDER REVIEW    Documents Description: PRE-AUTH REQUEST    Name of Sender: [employer]    Date Indexed: 06/19/2023    Notes (if needed): REQUEST FOR PRIOR AUTH FOR CIPROFLOXACIN-DEXAMETHASONE 0.3-0.1% SUSP

## 2023-06-22 ENCOUNTER — TELEPHONE (OUTPATIENT)
Dept: OTOLARYNGOLOGY | Facility: CLINIC | Age: 83
End: 2023-06-22

## 2023-06-22 NOTE — TELEPHONE ENCOUNTER
The [facility] received a fax that requires your attention. The document has been indexed to the patient’s chart for your review.      Reason for sending: NEEDS PROVIDER REVIEW    Documents Description: NOTICE OF DENIAL OF MEDICARE PART D PRESCRIPTION DRUG COVERAGE/REQUEST FOR REDETERMINATION OF MEDICARE PRESCRIPTION DRUG DENIAL    Name of Sender: [employer]    Date Indexed: 06/20/2023 (EXT MED RECS)    Notes (if needed): DENIAL FOR CIPROFLOX-DEXAMETH OTIC SUSP

## 2023-06-23 ENCOUNTER — TELEPHONE (OUTPATIENT)
Dept: OTOLARYNGOLOGY | Facility: CLINIC | Age: 83
End: 2023-06-23

## 2023-06-23 NOTE — TELEPHONE ENCOUNTER
The [facility] received a fax that requires your attention. The document has been indexed to the patient's chart for your review.    Reason for sending: PA DENIED, CHANGE MED?    Documents Description: RX REVIEW REQ_[facility]_06/21/23    Name of Sender: [facility]    Date Indexed: 06/23/23

## 2023-07-13 ENCOUNTER — HOSPITAL ENCOUNTER (OUTPATIENT)
Dept: RADIATION ONCOLOGY | Facility: HOSPITAL | Age: 83
Setting detail: RADIATION/ONCOLOGY SERIES
End: 2023-07-13
Payer: MEDICARE

## 2023-07-20 PROCEDURE — G0463 HOSPITAL OUTPT CLINIC VISIT: HCPCS | Performed by: RADIOLOGY

## 2023-08-04 ENCOUNTER — HOSPITAL ENCOUNTER (OUTPATIENT)
Dept: CT IMAGING | Facility: HOSPITAL | Age: 83
Discharge: HOME | End: 2023-08-04
Payer: MEDICARE

## 2023-08-04 DIAGNOSIS — C44.211 BASAL CELL CARCINOMA (BCC) OF SKIN OF EAR, UNSPECIFIED LATERALITY: ICD-10-CM

## 2023-08-04 DIAGNOSIS — R93.0 ABNORMAL FINDINGS ON DIAGNOSTIC IMAGING OF SKULL AND HEAD, NOT ELSEWHERE CLASSIFIED: ICD-10-CM

## 2023-08-04 PROCEDURE — A9552 F18 FDG: HCPCS | Performed by: INTERNAL MEDICINE

## 2023-08-04 PROCEDURE — 78816 PET IMAGE W/CT FULL BODY: CPT

## 2023-08-04 PROCEDURE — 0 FLUDEOXYGLUCOSE F18 SOLUTION: Performed by: INTERNAL MEDICINE

## 2023-08-04 RX ADMIN — FLUDEOXYGLUCOSE F18 1 DOSE: 300 INJECTION INTRAVENOUS at 10:21

## 2023-08-19 NOTE — PROGRESS NOTES
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  She has received definitive intent radiotherapy (7000 cGy in 35 daily fractions - 1/11/23-3/6/23). .  She is accompanied by her granddaughter, Keturah Bonner     I have reviewed the HPI and verified with the patient the accuracy of it. No changes to interval history since the information was documented. Reynaldo Mock MD 08/24/23      Diagnostic abnormalities:  --History includes diabetes, and was previously followed by Dr. Beth Richards, [facility] for chronic thrombocytopenia.  Question ITP vs MDS.  Last seen in office by Dr. Richards, 07/08/2017.  At that time platelets have been stable for the past few years without active bleeding.  Platelet count was 23,000,.  To a baseline of 30,000.  No aggressive treatments were pursued.  Previous bone marrow biopsy, 11/05/2012 showed hypercellular marrow showing maturing trilineage hematopoiesis with mild dyserythropoiesis and mild megakaryocytic atypia.  No increase in blasts.  Plan: If platelet continues to worsen drops to 10,000-20,000 or evidence of active bleeding then platelet transfusion can be tried and if this does not work then immunosuppressant such as prednisone will be next treatment option.  --07/29/2022-seen by PCP, Dr. Marvin Vasques.  Patient referred to ENT after right ear exam showed stenotic ear canal with a vestibule showing scaly plaques/mass/neoplasm?  --09/01/2022- seen for ENT by Dr. Cao.  Right ear scabby and draining puslike substance for the past 1 year.  Patient stated  that she had a lesion to her right ear for the past year that has progressively gotten larger, and had blistered few days prior before starting to drain.  --09/23/2022- CT temporal bones-1. Abnormal skin thickening and enhancement at the right acoustic meatus. Recommend clinical correlation/visual inspection in this area. The right external canal is near completely opacified with soft tissue debris. I do not see destructive bony change along the external canal. 2. Middle ear cavities and mastoids are clear. Ossicular chains are intact.3. Nearly dehiscent superior semicircular canals, bilateral.  -- 11/03/2022- 3 mm punch biopsy lesion from jamari right ear.  Microscopic diagnosis: Basal cell carcinoma-surrounded by scar tissue (shave specimen and superficial skin)  --11/28/2022 -CMP normal.  B12 1194, folate>20, ferritin 65, iron, iron saturation 20%, hemoglobin 11.9, hematocrit 38.8, MCV 90.9, WBC 8.84, platelets 48,000  -- 12/05/2022 - CT head- 1. Age-appropriate atrophy. 2. Otherwise normal CT of the brain with and without contrast.  -- 12/05/2022- CT soft tissue neck-  1. 2.0 x 1.8 x 1.2 cm enhancing skin lesion involving the jamari of the right ear. Up to 1.1 cm depth of invasion with the deep margin essentially abutting the bony elements of the external auditory canal along the roof and floor of the external canal. There is also a 7 mm depth of invasion inferiorly, with the lesion abutting the capsule of the superficial lobe of the right parotid gland. There is a 7 mm hyperenhancing lymph node in the upper level 2B neck. This is not technically enlarged by size criteria but this does appear to be hyperenhancing relative to the other cervical nodes and I'm suspicious that this is a leyda metastasis.  -- 12/05/2022- CT chest- 1. Rather benign-appearing subpleural pulmonary nodule in the right lower lobe which could be followed up on subsequent imaging. There is mild basilar atelectasis as well as linear scarring  or atelectasis in the left upper lobe. No evidence of consolidative pneumonia. 2. Heavy coronary calcifications are present. There is mild cardiomegaly. The thoracic aorta and great vessels are ectatic. 3. No convincing evidence of metastatic disease to the thorax. 4. Hiatal hernia. There is a cortical cyst of the upper pole of the left kidney.  -- 12/05/2022 - CT abdomen/pelvis- 1. No radiographic evidence of metastatic disease to the abdomen or pelvis. 2. Multiple compression deformities in the thoracic and lumbar spine. The fracture at L3 is more acute to subacute in appearance and should be correlated clinically. There is an accentuated thoracic kyphosis with mild gibbus deformity with the thoracolumbar juncture. 3. Cortical cyst upper pole left kidney. No evidence of nephrolithiasis or obstructive uropathy. 4. Moderate hiatal hernia. 5. Heavy atheromatous calcification of the abdominal aorta and iliac arteries with no evidence of aneurysm.  --12/09/2022- Consult-Dr. Rhodes of radiation oncology- RECOMMENDATIONS: Rosanna Chaparro has been referred to our office today to discuss radiotherapy recommendations for locally advanced high risk basal cell skin cancer of the right ear acoustic meatus obstructing the distal auditory canal.  Tumor burden includes a 2 cm tumor present at the acoustic meatus causing decreased unilatera hearing with up to 1.1 cm depth of invasion with the deep margin essentially abutting the bony elements of the external auditory canal along the roof and floor of the external canaliorly, with the lesion abutting the capsule of the superficial lobe of the right parotid gland. There is a 7 mm hyperenhancing lymph node in the upper level 2B neck.  Per radiology this is not technically enlarged by size criteria but this does appear to be hyperenhancing relative to the other cervical nodes and I'm suspicious that this is a leyda metastasis, thus T1N0 MX versus T1N1MX presentation.     Patient  is considering treatment options that have been offered to her including surgical resection and systemic therapy with vismodegib.  She presents today for consultation regarding radiotherapy options.     We have discussed the indications and rationale of radiation therapy according to the NCCN Guidelines.  Given a prominent ipsilateral neck lymph node that measures 7 mm with hyperintensity on CT scan, I discussed possibility of obtaining a biopsy or proceeding with PET scan to rule out regional leyda spread.  I do believe definitive intent radiotherapy will be a good option for her but she is also considering surgical and systemic therapy options.  I have extensively reviewed the risks, benefits and alternatives of therapy and progression of disease in spite of therapy with either local or systemic failure.   I have seen, examined and reviewed her medication list, appropriate labs and imaging studies as well as other physician notes. We discussed the goals/plans of care and answered all questions.      In summary, 3 options were presented to her which included needle biopsy of a prominent ipsilateral neck lymph node, which of positive may lend itself to additional treatment recommendations, versus PET scan staging, versus proceeding with definitive intent radiotherapy to include primary lesion and suspicious ipsilateral lymphatic region.  I explained the intent, benefits, course, precautions, and side effects of treatment which include worsening skin reactions including erythema and skin desquamation, radiation-induced hearing changes, radiation-induced salivary gland dysfunction which can lead to xerostomia, patchy alopecia, fatigue and possibly ipsilateral oropharyngeal mucositis.  Their questions were answered to their satisfaction and at the end of our discussion patient and her granddaughter wished to discuss the totality of treatment options presented to them with her family and call back with a decision.  If  "patient wishes to proceed with definitive intent radiotherapy, anticipate treating to a dose of 7000 cGy to be given over 35 daily fractions (Monday through Friday).  We will await patient call back with a decision.    --01/13/2023- PET scan:  1.  2.2 x 1.1 cm hypermetabolic soft tissue thickening in the RIGHT external auditory canal, correlating with biopsy-proven basal cell carcinoma. 2.  No evidence of hypermetabolic metastatic disease in the head, neck, chest, abdomen, pelvis, or bilateral lower extremities. 3.  Moderate size hiatal hernia    Previous interventions:  -- Definitive intent radiotherapy, 7000 cGy over 35 daily fractions (Monday- Friday).1/11/2023- 3/6/23    PAST MEDICAL HISTORY:  ALLERGIES:  Allergies   Allergen Reactions    Tramadol Mental Status Change     Made her \"crazy\"     CURRENT MEDICATIONS:  Outpatient Encounter Medications as of 8/24/2023   Medication Sig Dispense Refill    acetaminophen (TYLENOL) 500 MG tablet Take 1 tablet by mouth Every 6 (Six) Hours As Needed for Mild Pain.      ascorbic acid (VITAMIN C) 1000 MG tablet Take 1 tablet by mouth Daily.      CALCIUM PO Take 1 tablet by mouth Daily.      CRANBERRY PO Take 1 tablet by mouth Daily.      ibuprofen (ADVIL,MOTRIN) 200 MG tablet Take 1 tablet by mouth Every 6 (Six) Hours As Needed for Mild Pain.      Multiple Vitamins-Minerals (EMERGEN-C IMMUNE PLUS PO) Take 1 tablet by mouth Daily.      potassium chloride (K-DUR,KLOR-CON) 20 MEQ CR tablet Take 1 tablet by mouth 2 (Two) Times a Day.      Vitamin D, Cholecalciferol, (CHOLECALCIFEROL) 10 MCG (400 UNIT) tablet Take 1 tablet by mouth Daily.       No facility-administered encounter medications on file as of 8/24/2023.     Adult illnesses:  Diabetes mellitus  Chronic thrombocytopenia (ITP+/- MDS)  Former smoker  Basal cell carcinoma right ear    Past surgeries:  Laparoscopic inguinal hernia repair  Appendectomy  Cholecystectomy, 2000  Renal lithotripsy x3 for renal stones, most " "recently 03/28/2018  Orthopedic surgery: Left shoulder fracture repair, 2018 with blood transfusion  Biopsy right ear jamari, 11/3/2022    ADULT ILLNESSES:  Patient Active Problem List   Diagnosis Code    Neoplasm of uncertain behavior of ear D48.5    Basal cell carcinoma C44.91    Non-smoker Z78.9    Thrombocytopenia D69.6    History of radiation therapy Z92.3    Basal cell carcinoma (BCC) of skin of ear C44.211     SURGERIES:  Past Surgical History:   Procedure Laterality Date    APPENDECTOMY      CHOLECYSTECTOMY      SHOULDER SURGERY Left      HEALTH MAINTENANCE ITEMS:  Health Maintenance Due   Topic Date Due    URINE MICROALBUMIN  Never done    DXA SCAN  Never done    Pneumococcal Vaccine 65+ (1 - PCV) Never done    TDAP/TD VACCINES (1 - Tdap) Never done    ZOSTER VACCINE (1 of 2) Never done    COVID-19 Vaccine (3 - Moderna risk series) 05/03/2021    ANNUAL WELLNESS VISIT  Never done    HEMOGLOBIN A1C  Never done    DIABETIC EYE EXAM  Never done       <no information>  Last Completed Colonoscopy       This patient has no relevant Health Maintenance data.            There is no immunization history on file for this patient.  Last Completed Mammogram       This patient has no relevant Health Maintenance data.              FAMILY HISTORY:  History reviewed. No pertinent family history.  SOCIAL HISTORY:  Social History     Socioeconomic History    Marital status:    Tobacco Use    Smoking status: Never    Smokeless tobacco: Never   Vaping Use    Vaping Use: Never used   Substance and Sexual Activity    Alcohol use: Never    Drug use: Never       REVIEW OF SYSTEMS:  Review of Systems   Constitutional: Negative.  Negative for fatigue.        Manages her personal ADLs, house chores, but does not run errands on her own and has not been driving. \"I could but my  won't let me.\" Is up and about \"most all the time.\" Has a rolling walker at home that she hardly uses.  \"I just have it in case.'   HENT:  Positive " "for hearing loss (Non-healing lesion in the right ear, \"not sure after the radiation but doesn't seem to be blocking the way anymore\").    Eyes: Negative.    Respiratory: Negative.     Cardiovascular: Negative.    Gastrointestinal: Negative.    Endocrine: Negative.    Genitourinary: Negative.    Musculoskeletal:  Negative for arthralgias.   Skin:  Positive for skin lesions (right ear canal lesion).   Allergic/Immunologic: Negative.    Neurological: Negative.    Hematological: Negative.    Psychiatric/Behavioral: Negative.         BP 96/54   Pulse 78   Temp 97.4 øF (36.3 øC) (Temporal)   Resp 18   Ht 167.6 cm (66\")   Wt 68.1 kg (150 lb 3.2 oz)   LMP  (LMP Unknown)   SpO2 99%   BMI 24.24 kg/mý  Body surface area is 1.77 meters squared.  Pain Score    08/24/23 1055   PainSc: 0-No pain           Physical Exam  Vitals reviewed.   Constitutional:       Comments: Pleasant, cooperative, modestly kept, heavy-set elderly female.  Brought in by wheelchair but is otherwise ambulatory.  ECOG 0-1.      Has lost 10 lb (in addition to 4 lb at her prior visit) since the last visit   HENT:      Head: Normocephalic and atraumatic.      Ears:      Comments: Again note stenotic ear canal with acoustic meatus again notable for pigmentation anteriorly and posteriorly which have visibly regressed since receipt of RT.  RT associated skin hyperemia surrounding the lesion seems less prominent     Mouth/Throat:      Comments: Is wearing a surgical mask today  Eyes:      General: No scleral icterus.     Extraocular Movements: Extraocular movements intact.      Conjunctiva/sclera: Conjunctivae normal.      Pupils: Pupils are equal, round, and reactive to light.   Cardiovascular:      Rate and Rhythm: Normal rate.   Pulmonary:      Effort: Pulmonary effort is normal.   Abdominal:      Palpations: Abdomen is soft.      Tenderness: There is no abdominal tenderness.   Musculoskeletal:         General: Normal range of motion.      Cervical " back: Normal range of motion.   Lymphadenopathy:      Head:      Right side of head: No submental, submandibular, preauricular, posterior auricular or occipital adenopathy.      Left side of head: No submental, submandibular, preauricular, posterior auricular or occipital adenopathy.      Cervical: No cervical adenopathy.      Right cervical: No superficial, deep or posterior cervical adenopathy.     Left cervical: No superficial, deep or posterior cervical adenopathy.      Upper Body:      Right upper body: No supraclavicular or axillary adenopathy.      Left upper body: No supraclavicular or axillary adenopathy.   Skin:     Findings: Lesion (right ear canal) present.   Neurological:      General: No focal deficit present.      Mental Status: She is alert and oriented to person, place, and time.   Psychiatric:         Mood and Affect: Mood normal.         Thought Content: Thought content normal.       Assessment:  1.   Basal cell carcinoma of the right ear acoustic meatus  -- Tumor stage: cT1, cN0   -- Tumor burden/complications:  2 cm tumor present at the acoustic meatus causing decreased unilateral hearing with up to 1.1 cm depth of invasion with the deep margin essentially abutting the bony elements of the external auditory canal along the roof and floor of the external canaliorly, with the lesion abutting the capsule of the superficial lobe of the right parotid gland. There is a 7 mm hyperenhancing lymph node in the upper level 2B neck.  Per radiology this is not technically enlarged by size criteria but this does appear to be hyperenhancing relative to the other cervical nodes suspicious for leyda metastasis  -- High risk of recurrence (lesion on head and neck).  Not felt to be excisional candidate due to locally advanced lesion.  -- Up-to-date algorithm for nonsurgical candidates recommends primary radiation therapy (preferred) or hedgehog pathway inhibitor (preferred) or immune checkpoint inhibitor  (cemiplimab).  Clinical follow-up for recurrence every 6-12 months.  --01/13/2023- PET scan:  1.  2.2 x 1.1 cm hypermetabolic soft tissue thickening in the RIGHT external auditory canal, correlating with biopsy-proven basal cell carcinoma. 2.  No evidence of hypermetabolic metastatic disease in the head, neck, chest, abdomen, pelvis, or bilateral lower extremities. 3.  Moderate size hiatal hernia  -- Definitive intent radiotherapy (7000 cGy in 35 daily fractions - 1/11/23- 3/6/23)  --8/4/23- PET scan- Decreasing size and intensity of FDG uptake of the right external auditory canal supporting response to current therapy. No evidence of regional lymphatic or distant metastasis.    2.   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 Beth Richards, [facility] for chronic thrombocytopenia.  Question ITP vs MDS.  Last seen in office by Dr. Richards, 07/08/2017.  At that time platelets have been stable for the past few years without active bleeding.  Platelet count was 23,000,.  To a baseline of 30,000.  No aggressive treatments were pursued.  Previous bone marrow biopsy, 11/05/2012 showed hypercellular marrow showing maturing trilineage hematopoiesis with mild dyserythropoiesis and mild megakaryocytic atypia.  No increase in blasts.    --8/24/23- platelets 31,000 (ramon: 23,000 per records)  3.   Mild normocytic anemia  --Stable.  Hgb 11.6, 8/24/23 (prior:  Hgb 11.9, 11/28/22)  4.   Diabetes mellitus.  5.   Advanced age    Plan:  1.   Apprised of labs, 8/24/23- Hgb 11.6; MCV 91, WBC 5.7, platelets 31 (stable)    2.   Add to labs:  Iron, fe sat, ferritin, B12, folate, anti-platelets antibodies and manual smear review by pathology    3.   Apprised of the PET scan, 8/4/23 (above).  Decreasing size and intensity of FDG uptake of the right external auditory canal supporting response to current therapy. No evidence of regional lymphatic or distant metastasis.    4.   Review progress  "note from PRATIBHA Dave with Dr. Taylor, 4/20/23 - A/P: Locally advanced high risk basal cell skin cancer of the right ear acoustic meatus obstructing the distal auditory canal. She completed 7000 cGy in 35 fractions to the right ear on 03/06/2023.  Without evidence for recurrent or metastatic disease on exam today. She will return in 3 months for follow up.     5.   Erivedge (vismodegib).  Potential toxicities again discussed (to include but not limited to: Liver injury, azotemia, hyponatremia, hypokalemia, Andres-Gulshan syndrome, toxic epidermal necrolysis, muscle spasms, alopecia, dysgeusia, weight loss, fatigue, nausea, amenorrhea, diarrhea, decreased appetite, constipation, arthralgia, vomiting, loss of taste, elevated CK, azotemia.    6.  Previously discussed that if PET scan does not show metastatic disease and if disease can be encompassed by definitive RT, given patient's advanced age and comorbidities, will consider withholding systemic therapy until she develops more widespread \"measurable disease\".  On the other hand, if PET scan shows metastatic disease, will consider systemic therapy up-front    7.   Continue management per PCP and other specialists  8.   Schedule whole body PET scan in 23 weeks  9.   Return to office in 24 weeks with preoffice CMP, iron, fe sat, ferritin, b12, folate and CBC w diff.      I spent   minutes caring for  Rosanna on this date of service. This time includes time spent by me in the following activities: preparing for the visit, reviewing tests, performing a medically appropriate examination and/or evaluation, counseling and educating the patient/family/caregiver, ordering medications, tests, or procedures and documenting information in the medical record.       "

## 2023-08-24 ENCOUNTER — LAB (OUTPATIENT)
Dept: LAB | Facility: HOSPITAL | Age: 83
End: 2023-08-24
Payer: MEDICARE

## 2023-08-24 ENCOUNTER — OFFICE VISIT (OUTPATIENT)
Dept: ONCOLOGY | Facility: CLINIC | Age: 83
End: 2023-08-24
Payer: MEDICARE

## 2023-08-24 VITALS
OXYGEN SATURATION: 99 % | TEMPERATURE: 97.4 F | BODY MASS INDEX: 24.14 KG/M2 | RESPIRATION RATE: 18 BRPM | DIASTOLIC BLOOD PRESSURE: 54 MMHG | SYSTOLIC BLOOD PRESSURE: 96 MMHG | WEIGHT: 150.2 LBS | HEART RATE: 78 BPM | HEIGHT: 66 IN

## 2023-08-24 DIAGNOSIS — C44.212 BASAL CELL CARCINOMA (BCC) OF SKIN OF RIGHT EAR: Primary | ICD-10-CM

## 2023-08-24 DIAGNOSIS — C44.211 BASAL CELL CARCINOMA (BCC) OF SKIN OF EAR, UNSPECIFIED LATERALITY: ICD-10-CM

## 2023-08-24 LAB
ALBUMIN SERPL-MCNC: 4.1 G/DL (ref 3.5–5.2)
ALBUMIN/GLOB SERPL: 1.5 G/DL
ALP SERPL-CCNC: 55 U/L (ref 39–117)
ALT SERPL W P-5'-P-CCNC: 12 U/L (ref 1–33)
ANION GAP SERPL CALCULATED.3IONS-SCNC: 11 MMOL/L (ref 5–15)
AST SERPL-CCNC: 18 U/L (ref 1–32)
BASOPHILS # BLD AUTO: 0.05 10*3/MM3 (ref 0–0.2)
BASOPHILS NFR BLD AUTO: 0.9 % (ref 0–1.5)
BILIRUB SERPL-MCNC: 0.4 MG/DL (ref 0–1.2)
BUN SERPL-MCNC: 29 MG/DL (ref 8–23)
BUN/CREAT SERPL: 31.5 (ref 7–25)
CALCIUM SPEC-SCNC: 9.5 MG/DL (ref 8.6–10.5)
CHLORIDE SERPL-SCNC: 104 MMOL/L (ref 98–107)
CO2 SERPL-SCNC: 24 MMOL/L (ref 22–29)
CREAT SERPL-MCNC: 0.92 MG/DL (ref 0.57–1)
CYTOLOGIST CVX/VAG CYTO: NORMAL
DEPRECATED RDW RBC AUTO: 46.1 FL (ref 37–54)
EGFRCR SERPLBLD CKD-EPI 2021: 61.9 ML/MIN/1.73
EOSINOPHIL # BLD AUTO: 0.25 10*3/MM3 (ref 0–0.4)
EOSINOPHIL NFR BLD AUTO: 4.3 % (ref 0.3–6.2)
ERYTHROCYTE [DISTWIDTH] IN BLOOD BY AUTOMATED COUNT: 13.7 % (ref 12.3–15.4)
FERRITIN SERPL-MCNC: 63.85 NG/ML (ref 13–150)
FOLATE SERPL-MCNC: >20 NG/ML (ref 4.78–24.2)
GLOBULIN UR ELPH-MCNC: 2.8 GM/DL
GLUCOSE SERPL-MCNC: 98 MG/DL (ref 65–99)
HCT VFR BLD AUTO: 37.8 % (ref 34–46.6)
HGB BLD-MCNC: 11.6 G/DL (ref 12–15.9)
HOLD SPECIMEN: NORMAL
IRON 24H UR-MRATE: 77 MCG/DL (ref 37–145)
IRON SATN MFR SERPL: 22 % (ref 20–50)
LYMPHOCYTES # BLD AUTO: 1.59 10*3/MM3 (ref 0.7–3.1)
LYMPHOCYTES NFR BLD AUTO: 27.6 % (ref 19.6–45.3)
MCH RBC QN AUTO: 28 PG (ref 26.6–33)
MCHC RBC AUTO-ENTMCNC: 30.7 G/DL (ref 31.5–35.7)
MCV RBC AUTO: 91.1 FL (ref 79–97)
MONOCYTES # BLD AUTO: 0.44 10*3/MM3 (ref 0.1–0.9)
MONOCYTES NFR BLD AUTO: 7.6 % (ref 5–12)
NEUTROPHILS NFR BLD AUTO: 3.43 10*3/MM3 (ref 1.7–7)
NEUTROPHILS NFR BLD AUTO: 59.4 % (ref 42.7–76)
PATH INTERP BLD-IMP: NORMAL
PLATELET # BLD AUTO: 31 10*3/MM3 (ref 140–450)
PMV BLD AUTO: 12.3 FL (ref 6–12)
POTASSIUM SERPL-SCNC: 4.3 MMOL/L (ref 3.5–5.2)
PROT SERPL-MCNC: 6.9 G/DL (ref 6–8.5)
RBC # BLD AUTO: 4.15 10*6/MM3 (ref 3.77–5.28)
SODIUM SERPL-SCNC: 139 MMOL/L (ref 136–145)
TIBC SERPL-MCNC: 347 MCG/DL (ref 298–536)
TRANSFERRIN SERPL-MCNC: 233 MG/DL (ref 200–360)
WBC NRBC COR # BLD: 5.77 10*3/MM3 (ref 3.4–10.8)

## 2023-08-24 PROCEDURE — 82728 ASSAY OF FERRITIN: CPT

## 2023-08-24 PROCEDURE — 83540 ASSAY OF IRON: CPT

## 2023-08-24 PROCEDURE — 80053 COMPREHEN METABOLIC PANEL: CPT

## 2023-08-24 PROCEDURE — 82607 VITAMIN B-12: CPT

## 2023-08-24 PROCEDURE — 86022 PLATELET ANTIBODIES: CPT

## 2023-08-24 PROCEDURE — 36415 COLL VENOUS BLD VENIPUNCTURE: CPT

## 2023-08-24 PROCEDURE — 82746 ASSAY OF FOLIC ACID SERUM: CPT

## 2023-08-24 PROCEDURE — 85025 COMPLETE CBC W/AUTO DIFF WBC: CPT

## 2023-08-24 PROCEDURE — 84466 ASSAY OF TRANSFERRIN: CPT

## 2023-08-24 PROCEDURE — 85060 BLOOD SMEAR INTERPRETATION: CPT

## 2023-08-24 RX ORDER — POTASSIUM CHLORIDE 20 MEQ/1
20 TABLET, EXTENDED RELEASE ORAL 2 TIMES DAILY
COMMUNITY

## 2023-08-25 LAB — VIT B12 BLD-MCNC: 625 PG/ML (ref 211–946)

## 2023-08-27 LAB
HLA AB SER QL IA: NEGATIVE
PLAT GP AB SER QL IA: NEGATIVE
PLAT GP IA/IIA AB SER QL IA: NEGATIVE
PLAT GP IB/IX AB SER QL IA: NEGATIVE
PLAT GP IIB/IIIA AB SER QL IA: NEGATIVE

## 2023-09-18 NOTE — PROGRESS NOTES
YOB: 1940  Location: [facility]  Location Address: [address] [place] [address], [facility], [address] [place] [address]  Location Phone: [phone]    Chief Complaint   Patient presents with    Basal Cell Carcinoma    Hearing Loss     Patient has hearing aids but does not wear her right hearing aid       History of Present Illness  Rosanna Chaparro is a 83 y.o. female.  Rosanna Chaparro is here for follow up of ENT complaints. The patient is s/p basal cell carcinoma excision of the right ear 2022. Ct temporal bones revealed opacification of the right external canal. Medical management was recommended and she has undergone radiation treatments   Repeat pet scan was performed in August which showed decreasing uptake of lesion  She complains of hearing to the right ear being significantly muffled as well as intermittent right ear drainage      NM PET/CT Whole Body (2023 12:01)     CT Temporal Bones With & Without Contrast (2022 14:25)      Past Medical History:   Diagnosis Date    Basal cell carcinoma (BCC) of jamari of right ear     Idiopathic thrombocytopenic purpura (ITP)     Kidney stones        Past Surgical History:   Procedure Laterality Date    APPENDECTOMY      CHOLECYSTECTOMY      SHOULDER SURGERY Left        Outpatient Medications Marked as Taking for the 23 encounter (Office Visit) with Yahir Cao MD   Medication Sig Dispense Refill    acetaminophen (TYLENOL) 500 MG tablet Take 1 tablet by mouth Every 6 (Six) Hours As Needed for Mild Pain.      ascorbic acid (VITAMIN C) 1000 MG tablet Take 1 tablet by mouth Daily.      CALCIUM PO Take 1 tablet by mouth Daily.      CRANBERRY PO Take 1 tablet by mouth Daily.      Multiple Vitamins-Minerals (EMERGEN-C IMMUNE PLUS PO) Take 1 tablet by mouth Daily.      potassium chloride (K-DUR,KLOR-CON) 20 MEQ CR tablet Take 1 tablet by mouth 2 (Two) Times a Day.      Vitamin D, Cholecalciferol, (CHOLECALCIFEROL) 10 MCG (400 UNIT)  tablet Take 1 tablet by mouth Daily.         Tramadol    History reviewed. No pertinent family history.    Social History     Socioeconomic History    Marital status:    Tobacco Use    Smoking status: Never    Smokeless tobacco: Never   Vaping Use    Vaping Use: Never used   Substance and Sexual Activity    Alcohol use: Never    Drug use: Never       Review of Systems   Constitutional:  Positive for fatigue.   HENT:  Positive for ear discharge, ear pain and hearing loss.    Musculoskeletal:  Positive for gait problem.     Vitals:    09/19/23 1018   BP: 99/62   Pulse: 76   Resp: 16   Temp: 97.7 °F (36.5 °C)       Body mass index is 24.21 kg/m².    Objective     Physical Exam  Vitals reviewed.   Constitutional:       Appearance: She is ill-appearing.   HENT:      Head: Normocephalic.      Left Ear: Hearing, tympanic membrane, ear canal and external ear normal.      Ears:      Comments: Right with significant canal stenosis and mild inflammation   Crusted debris noted to external canal       Dr. Cao has examined and assessed the patient and agrees with current treatment plan      Assessment & Plan   Diagnoses and all orders for this visit:    1. Otorrhea of right ear (Primary)    2. Basal cell carcinoma (BCC) of jamari of right ear  -     Comprehensive Hearing Test; Future    3. Neoplasm of uncertain behavior of ear  -     Comprehensive Hearing Test; Future    4. Sensorineural hearing loss (SNHL) of right ear, unspecified hearing status on contralateral side  -     Comprehensive Hearing Test; Future      * Surgery not found *  Orders Placed This Encounter   Procedures    Comprehensive Hearing Test     Standing Status:   Future     Standing Expiration Date:   9/19/2024     Order Specific Question:   Laterality     Answer:   Bilateral     Order Specific Question:   Release to patient     Answer:   Routine Release [[id number]]     Return in about 5 months (around 2/19/2024) for Recheck.     Hearing test at f/u    Call for new/worsening concerns   Can use ciprodex as needed    There are no Patient Instructions on file for this visit.

## 2023-09-19 ENCOUNTER — OFFICE VISIT (OUTPATIENT)
Dept: OTOLARYNGOLOGY | Facility: CLINIC | Age: 83
End: 2023-09-19
Payer: MEDICARE

## 2023-09-19 VITALS
TEMPERATURE: 97.7 F | SYSTOLIC BLOOD PRESSURE: 99 MMHG | BODY MASS INDEX: 24.11 KG/M2 | DIASTOLIC BLOOD PRESSURE: 62 MMHG | RESPIRATION RATE: 16 BRPM | WEIGHT: 150 LBS | HEIGHT: 66 IN | HEART RATE: 76 BPM

## 2023-09-19 DIAGNOSIS — H92.11 OTORRHEA OF RIGHT EAR: Primary | ICD-10-CM

## 2023-09-19 DIAGNOSIS — C44.212: ICD-10-CM

## 2023-09-19 DIAGNOSIS — H90.5 SENSORINEURAL HEARING LOSS (SNHL) OF RIGHT EAR, UNSPECIFIED HEARING STATUS ON CONTRALATERAL SIDE: ICD-10-CM

## 2023-09-19 DIAGNOSIS — D48.5 NEOPLASM OF UNCERTAIN BEHAVIOR OF EAR: ICD-10-CM

## 2023-10-12 ENCOUNTER — HOSPITAL ENCOUNTER (OUTPATIENT)
Dept: RADIATION ONCOLOGY | Facility: HOSPITAL | Age: 83
Setting detail: RADIATION/ONCOLOGY SERIES
End: 2023-10-12
Payer: MEDICARE

## 2023-10-16 ENCOUNTER — TELEPHONE (OUTPATIENT)
Dept: OTOLARYNGOLOGY | Facility: CLINIC | Age: 83
End: 2023-10-16
Payer: MEDICARE

## 2023-10-16 RX ORDER — CIPROFLOXACIN AND DEXAMETHASONE 3; 1 MG/ML; MG/ML
4 SUSPENSION/ DROPS AURICULAR (OTIC) 4 TIMES DAILY
Qty: 7.5 ML | Refills: 0 | Status: SHIPPED | OUTPATIENT
Start: 2023-10-16 | End: 2023-10-17

## 2023-10-16 NOTE — TELEPHONE ENCOUNTER
PATIENTS GRANDDAUGHTER REQUESTING EAR DROPS MEDICATION. SHE SAID IT WAS DISCUSSED AT LAST APPT BUT HASN'T BEEN CALLED IN. REQUEST CALL BACK AND CAN LEAVE VOICEMAIL

## 2023-10-17 ENCOUNTER — TELEPHONE (OUTPATIENT)
Dept: OTOLARYNGOLOGY | Facility: CLINIC | Age: 83
End: 2023-10-17
Payer: MEDICARE

## 2023-10-17 RX ORDER — OFLOXACIN 3 MG/ML
4 SOLUTION AURICULAR (OTIC) 2 TIMES DAILY
Qty: 10 ML | Refills: 0 | Status: SHIPPED | OUTPATIENT
Start: 2023-10-17 | End: 2023-11-11

## 2023-10-17 NOTE — TELEPHONE ENCOUNTER
Pt called stating she cannot afford the medication that was called in and wants to know if there is something cheaper that can be called in for her?      Callback number is [phone]

## 2023-10-17 NOTE — TELEPHONE ENCOUNTER
The [facility] received a fax that requires your attention. The document has been indexed to the patient’s chart for your review.      Reason for sending: PA REQUEST FOR CIPOROLOXACIN-DEXAMETHASONE     Documents Description: PRE-AUTHORIZATION REQUEST (EXT MED RECS)    Name of Sender: [employer]    Date Indexed: 10/16/2023    Notes (if needed):

## 2023-10-19 ENCOUNTER — OFFICE VISIT (OUTPATIENT)
Dept: RADIATION ONCOLOGY | Facility: HOSPITAL | Age: 83
End: 2023-10-19
Payer: MEDICARE

## 2023-10-19 ENCOUNTER — TELEPHONE (OUTPATIENT)
Dept: OTOLARYNGOLOGY | Facility: CLINIC | Age: 83
End: 2023-10-19

## 2023-10-19 VITALS
OXYGEN SATURATION: 99 % | DIASTOLIC BLOOD PRESSURE: 59 MMHG | BODY MASS INDEX: 24.11 KG/M2 | WEIGHT: 150 LBS | SYSTOLIC BLOOD PRESSURE: 95 MMHG | HEIGHT: 66 IN | HEART RATE: 78 BPM

## 2023-10-19 DIAGNOSIS — Z92.3 HISTORY OF RADIATION THERAPY: ICD-10-CM

## 2023-10-19 DIAGNOSIS — C44.211 BASAL CELL CARCINOMA (BCC) OF SKIN OF EAR, UNSPECIFIED LATERALITY: Primary | ICD-10-CM

## 2023-10-19 DIAGNOSIS — Z78.9 NON-SMOKER: ICD-10-CM

## 2023-10-19 PROCEDURE — G0463 HOSPITAL OUTPT CLINIC VISIT: HCPCS | Performed by: RADIOLOGY

## 2023-10-19 NOTE — TELEPHONE ENCOUNTER
PT:  LEANDRO BENITEZ  :  [birth year]    PRIOR AUTHORIZATION FOR CIPROFLOXACIN-DEXAMETHASONE 0.3-0.1% SUSPENSION     [facility]   [phone]

## 2023-10-20 ENCOUNTER — APPOINTMENT (OUTPATIENT)
Dept: RADIATION ONCOLOGY | Facility: HOSPITAL | Age: 83
End: 2023-10-20
Payer: MEDICARE

## 2023-11-22 RX ORDER — OFLOXACIN 3 MG/ML
SOLUTION AURICULAR (OTIC)
Qty: 10 ML | Refills: 0 | Status: SHIPPED | OUTPATIENT
Start: 2023-11-22

## 2024-01-03 ENCOUNTER — HOSPITAL ENCOUNTER (OUTPATIENT)
Dept: RADIATION ONCOLOGY | Facility: HOSPITAL | Age: 84
Setting detail: RADIATION/ONCOLOGY SERIES
End: 2024-01-03
Payer: MEDICARE

## 2024-01-04 ENCOUNTER — OFFICE VISIT (OUTPATIENT)
Dept: RADIATION ONCOLOGY | Facility: HOSPITAL | Age: 84
End: 2024-01-04
Payer: MEDICARE

## 2024-01-04 VITALS
HEIGHT: 66 IN | WEIGHT: 147 LBS | BODY MASS INDEX: 23.63 KG/M2 | DIASTOLIC BLOOD PRESSURE: 50 MMHG | SYSTOLIC BLOOD PRESSURE: 93 MMHG

## 2024-01-04 DIAGNOSIS — C44.211 BASAL CELL CARCINOMA (BCC) OF SKIN OF EAR, UNSPECIFIED LATERALITY: Primary | ICD-10-CM

## 2024-01-04 DIAGNOSIS — Z78.9 NON-SMOKER: ICD-10-CM

## 2024-01-04 DIAGNOSIS — Z92.3 HISTORY OF RADIATION THERAPY: ICD-10-CM

## 2024-01-04 PROCEDURE — G0463 HOSPITAL OUTPT CLINIC VISIT: HCPCS | Performed by: RADIOLOGY

## 2024-01-04 NOTE — PROGRESS NOTES
[facility] Marshall Sanchez APRN  ____________________________________________________________  [facility]  Department of Radiation Oncology  [address] [place] [id number]  Office:  [phone]  Fax: [phone]    DATE: 01/04/2023  PATIENT: Rosanna Chaparro  1940                         MEDICAL RECORD #:  [id number]                 Chief Complaint   Patient presents with    Basal Cell Carcinoma     Right auditory canal     Reason for Visit: Rosanna Chaparro is a very pleasant 83 y.o. female that completed radiation therapy to the right ear and returns to the clinic today for routine follow up exam. Denies activity change, appetite change, unexpected weight change, nasuea/vomiting, diarrhea, light-headedness, weakness, and headaches. She continues to follow .     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 She completed 7000 cGy in 35 fractions to the right ear on 03/06/2023.     09/01/2022 - Appointment with Timmy Nunes APRN - Otolaryngology:  She complains of a lesionright ear present for 1 year(s)  Patient states she has had a lesion to her right ear for the past year that has progressively gotten larger. She states she had a blistered area a couple days ago that started draining.   Plan:  ct temporal bones prior to follow up   Follow up in 4 weeks and we will discuss excision of lesion   Follow up in about 4 weeks (around 9/29/2022).     09/23/2022 - CT Temporal Bones due to hearing loss:  Abnormal skin thickening and enhancement at the right acoustic meatus. Recommend clinical correlation/visual inspection in this area. The right external canal is near completely opacified with soft tissue debris. I do not see destructive bony change along the external canal.  Middle ear cavities and  mastoids are clear. Ossicular chains are intact.  Nearly dehiscent superior semicircular canals, bilateral.    09/27/2022 - Appointment with Timmy Nunes APRN - Otolaryngology:  Discussed surgical removal options vs medical management with patient. She would like to proceed with referral to Dr. Ha for treatment with Eriveage.  Dr. Cao examined and discussed care with patient and agrees with treatment plan.   Return in about 6 months (around 3/27/2023) for Recheck.    10/04/2022 - Documentation per Otolaryngology group:  Patient needs biospy of lesion per Dr. 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  Not felt to be excisional candidate due to locally advanced lesion.  Up-to-date algorithm for nonsurgical candidates recommends primary radiation therapy (preferred) or hedgehog pathway inhibitor (preferred) or immune checkpoint inhibitor (cemiplimab).  Clinical follow-up for current every 6-12 months.  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 Beth Richards, [facility] for chronic thrombocytopenia.  Question ITP vs MDS.  Last seen in office by Dr. Richards, 07/08/2017.  At that time platelets have been stable for the past few years without active bleeding.  Platelet count was 23,000,.  To a baseline of 30,000.  No aggressive treatments were pursued.  Previous bone marrow biopsy, 11/05/2012 showed hypercellular marrow showing maturing trilineage hematopoiesis with mild dyserythropoiesis and mild megakaryocytic atypia.  No increase in blasts.    Diabetes mellitus.  Advanced age  Plan:  Apprised of the available diagnostic information.  Draw  baseline CBC with differential, iron, iron saturation, ferritin, B12, folate, CMP  Refer to Dr. Taylor Re: Assess for definitive radiation  Teaching sheets for Erivedge (vismodegib).  Potential toxicities discussed (to include but not limited to: Liver injury, azotemia, hyponatremia, hypokalemia, Andres-Gulshan syndrome, toxic epidermal necrolysis, muscle spasms, alopecia, dysgeusia, weight loss, fatigue, nausea, amenorrhea, diarrhea, decreased appetite, constipation, arthralgia, vomiting, loss of taste, elevated CK, azotemia.  Schedule CT head, soft tissues of the neck, chest, abdomen/pelvis with p.o. and IV contrast.    Continue management per PCP and other specialists  Return to office in 4 weeks.  Further management recommendations pending.    12/05/2022 - CT Soft Tissue Neck with contrast:  2.0 x 1.8 x 1.2 cm enhancing skin lesion involving the jamari of the right ear. Up to 1.1 cm depth of invasion with the deep margin essentially abutting the bony elements of the external auditory canal along the roof and floor of the external canal.   There is also a 7 mm depth of invasion inferiorly, with the lesion abutting the capsule of the superficial lobe of the right parotid gland.   There is a 7 mm hyperenhancing lymph node in the upper level 2B neck. This is not technically enlarged by size criteria but this does appear to be hyperenhancing relative to the other cervical nodes and I'm suspicious that this is a leyda metastasis.    12/05/2022 - CT Chest with contrast:  Rather benign-appearing subpleural pulmonary nodule in the right lower lobe which could be followed up on subsequent imaging. There is mild basilar atelectasis as well as linear scarring or atelectasis in the left upper lobe. No evidence of consolidative pneumonia.  Heavy coronary calcifications are present. There is mild cardiomegaly. The thoracic aorta and great vessels are ectatic.  No convincing evidence of metastatic disease to the  thorax.  Hiatal hernia. There is a cortical cyst of the upper pole of the left kidney.    12/05/2022 - CT Abdomen/Pelvis with contrast:  No radiographic evidence of metastatic disease to the abdomen or pelvis.  Multiple compression deformities in the thoracic and lumbar spine. The fracture at L3 is more acute to subacute in appearance and should be correlated clinically. There is an accentuated thoracic kyphosis with mild gibbus deformity with the thoracolumbar juncture.  Cortical cyst upper pole left kidney. No evidence of nephrolithiasis or obstructive uropathy.  Moderate hiatal hernia.  Heavy atheromatous calcification of the abdominal aorta and iliac arteries with no evidence of aneurysm.    12/05/2022 - CT Head with and without contrast:  Age-appropriate atrophy.    12/06/2022 -Documentation per :  Pls confirm that pt has a referral to dr bonilla or dr sr of RT    12/09/2022 - Consult with  (Covering for ):  Patient is considering treatment options that have been offered to her including surgical resection and systemic therapy with vismodegib.  She presents today for consultation regarding radiotherapy options.  We have discussed the indications and rationale of radiation therapy according to the NCCN Guidelines.  Given a prominent ipsilateral neck lymph node that measures 7 mm with hyperintensity on CT scan, I discussed possibility of obtaining a biopsy or proceeding with PET scan to rule out regional leyda spread.  I do believe definitive intent radiotherapy will be a good option for her but she is also considering surgical and systemic therapy options.  I have extensively reviewed the risks, benefits and alternatives of therapy and progression of disease in spite of therapy with either local or systemic failure.   I have seen, examined and reviewed her medication list, appropriate labs and imaging studies as well as other physician notes. We discussed the goals/plans of  care and answered all questions.   In summary, 3 options were presented to her which included needle biopsy of a prominent ipsilateral neck lymph node, which of positive may lend itself to additional treatment recommendations, versus PET scan staging, versus proceeding with definitive intent radiotherapy to include primary lesion and suspicious ipsilateral lymphatic region.  I explained the intent, benefits, course, precautions, and side effects of treatment which include worsening skin reactions including erythema and skin desquamation, radiation-induced hearing changes, radiation-induced salivary gland dysfunction which can lead to xerostomia, patchy alopecia, fatigue and possibly ipsilateral oropharyngeal mucositis.  Their questions were answered to their satisfaction and at the end of our discussion patient and her granddaughter wished to discuss the totality of treatment options presented to them with her family and call back with a decision.  If patient wishes to proceed with definitive intent radiotherapy, anticipate treating to a dose of 7000 cGy to be given over 35 daily fractions (Monday through Friday).  We will await patient call back with a decision.  In the meantime, she was asked to follow-up with her other healthcare providers for routine medical care as per their scheduling.    12/14/2022 - Documentation per  (Covering for ):  Patient called back to schedule CT simulation wishing to move forward with definitive intent radiotherapy as previously discussed.  They declined to pursue additional work-up by way of PET scan or lymph node biopsy wanting to only pursue treatment at this time.  PLAN:  We will proceed with CT simulation today in anticipation of definitive intent radiotherapy to a dose of 7000 cGy to be given over 35 daily fractions and will make efforts to include the ipsilateral prominent lymph node previously discussed..    01/11/2023 - 03/06/2023 - Completed radiation  course:  Received 7000 cGy in 35 fractions to the right ear.    01/13/2023 - PET scan:  2.2 x 1.1 cm hypermetabolic soft tissue thickening in the RIGHT external auditory canal, correlating with biopsy-proven basal cell carcinoma.  No evidence of hypermetabolic metastatic disease in the head, neck, chest, abdomen, pelvis, or bilateral lower extremities.  Moderate size hiatal hernia.    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 There is a 7 mm hyperenhancing lymph node in the upper level 2B neck.  Per radiology this is not technically enlarged by size criteria but this does appear to be hyperenhancing relative to the other cervical nodes suspicious for leyda metastasis  High risk of recurrence (lesion on head and neck).  Not felt to be excisional candidate due to locally advanced lesion.  Up-to-date algorithm for nonsurgical candidates recommends primary radiation therapy (preferred) or hedgehog pathway inhibitor (preferred) or immune checkpoint inhibitor (cemiplimab).  Clinical follow-up for recurrence every 6-12 months.  --01/13/2023- PET scan:  1.  2.2 x 1.1 cm hypermetabolic soft tissue thickening in the RIGHT external auditory canal, correlating with biopsy-proven basal cell carcinoma. 2.  No evidence of hypermetabolic metastatic disease in the head, neck, chest, abdomen, pelvis, or bilateral lower extremities. 3.  Moderate size hiatal hernia  Definitive intent radiotherapy (anticipate 7000 cGy in 35 daily fractions - 1/11-present- 27/35 fx)  History of  "thrombocytopenia (ITP versus MDS)  --Previously followed by Dr. Beth Richards, [facility] for chronic thrombocytopenia.  Question ITP vs MDS.  Last seen in office by Dr. Richards, 07/08/2017.  At that time platelets have been stable for the past few years without active bleeding.  Platelet count was 23,000,.  To a baseline of 30,000.  No aggressive treatments were pursued.  Previous bone marrow biopsy, 11/05/2012 showed hypercellular marrow showing maturing trilineage hematopoiesis with mild dyserythropoiesis and mild megakaryocytic atypia.  No increase in blasts.    --11/28/2022- platelets 48,000 (ramon: 23,000 per records)  Diabetes mellitus.  Advanced age  Plan:  Apprised of the PET scan, 1/13/2023 (above).  No mets  Continue definitive intent radiotherapy (anticipate 7000 cGy in 35 daily fractions - 1/11-present- 27/35 fx)  Erivedge (vismodegib).  Potential toxicities again discussed (to include but not limited to: Liver injury, azotemia, hyponatremia, hypokalemia, Andres-Gulshan syndrome, toxic epidermal necrolysis, muscle spasms, alopecia, dysgeusia, weight loss, fatigue, nausea, amenorrhea, diarrhea, decreased appetite, constipation, arthralgia, vomiting, loss of taste, elevated CK, azotemia.    Discussed that if PET scan does not show metastatic disease and if disease can be encompassed by definitive RT, given patient's advanced age and comorbidities, will consider withholding systemic therapy until she develops more widespread \"measurable disease\".  On the other hand, if PET scan shows metastatic disease, will consider systemic therapy up-front    Continue management per PCP and other specialists  Schedule whole body PET scan in 23 weeks  Return to office in 24 weeks with preoffice CMP and CBC w diff.      04/20/2023 - Appointment with :  Rosanna Phillips [place] is status post completion of radiation therapy to right ear and presents to our clinic today for inital follow up exam. 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 She completed 7000 cGy in 35 fractions to the right ear on 03/06/2023.   I have reviewed the chart and other physicians records. she denies untoward side effects from treatment and without evidence for recurrent or metastatic disease on exam today.   She will return in 3 months for follow up.   Continue ongoing management per primary care physician and other specialists.  Plan:  Return in 3 months     04/20/2023 - Appointment with :  Rosanna Chaparro is status post completion of radiation therapy to right ear and presents to our clinic today for inital follow up exam. 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 She completed 7000 cGy in 35 fractions to the right ear on 03/06/2023.   I have reviewed the chart and other physicians records. she denies untoward side effects from treatment and without evidence for recurrent or metastatic disease on exam today.   She will return in 3 months for follow up.   Continue ongoing management per primary care physician and other specialists.  Plan:  Return in 3 months     07/20/2023 - Appointment with :  Follow up in 3 months     08/04/2023 - PET scan:  Decreasing size and intensity of FDG uptake of the right external auditory canal supporting response to current therapy.   No evidence of regional lymphatic or distant metastasis.    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 There is a 7 mm hyperenhancing lymph node in the upper level 2B neck.  Per radiology this is not technically enlarged by size criteria but this does appear to be hyperenhancing relative to the other cervical nodes suspicious for leyda metastasis  High risk of recurrence (lesion on head and neck).  Not felt to be excisional candidate due to locally advanced lesion.  Up-to-date algorithm for nonsurgical candidates recommends primary radiation therapy (preferred) or hedgehog pathway inhibitor (preferred) or immune checkpoint inhibitor (cemiplimab).  Clinical follow-up for recurrence every 6-12 months.  --01/13/2023- PET scan:  1.  2.2 x 1.1 cm hypermetabolic soft tissue thickening in the RIGHT external auditory canal, correlating with biopsy-proven basal cell carcinoma. 2.  No evidence of hypermetabolic metastatic disease in the head, neck, chest, abdomen, pelvis, or bilateral lower extremities. 3.  Moderate size hiatal hernia  Definitive intent radiotherapy (7000 cGy in 35 daily fractions - 1/11/23- 3/6/23)  --8/4/23- PET scan- Decreasing size and intensity of FDG uptake of the right external auditory canal supporting response to current therapy. No evidence of regional lymphatic or distant metastasis.  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 Beth Richards, [facility] for chronic thrombocytopenia.  Question ITP vs MDS.  Last seen in office by Dr. Richards, 07/08/2017.  At that time platelets have been stable for the past few years without active bleeding.  Platelet count was 23,000,.  To a baseline of 30,000.  No aggressive treatments were pursued.  Previous bone marrow biopsy, 11/05/2012 showed hypercellular marrow showing maturing trilineage hematopoiesis with mild dyserythropoiesis and mild megakaryocytic atypia.  No increase in blasts.    --8/24/23-  "platelets 31,000 (ramon: 23,000 per records)  Mild normocytic anemia  --Stable.  Hgb 11.6, 8/24/23 (prior:  Hgb 11.9, 11/28/22)  Diabetes mellitus.  Advanced age  Plan:  Apprised of labs, 8/24/23- Hgb 11.6; MCV 91, WBC 5.7, platelets 31 (stable)  Add to labs:  Iron, fe sat, ferritin, B12, folate, anti-platelets antibodies and manual smear review by pathology  Apprised of the PET scan, 8/4/23 (above).  Decreasing size and intensity of FDG uptake of the right external auditory canal supporting response to current therapy. No evidence of regional lymphatic or distant metastasis.  Review progress note from PRATIBHA Dave with Dr. Taylor, 4/20/23 - A/P: Locally advanced high risk basal cell skin cancer of the right ear acoustic meatus obstructing the distal auditory canal. She completed 7000 cGy in 35 fractions to the right ear on 03/06/2023.  Without evidence for recurrent or metastatic disease on exam today. She will return in 3 months for follow up.   Erivedge (vismodegib).  Potential toxicities again discussed (to include but not limited to: Liver injury, azotemia, hyponatremia, hypokalemia, Andres-Gulshan syndrome, toxic epidermal necrolysis, muscle spasms, alopecia, dysgeusia, weight loss, fatigue, nausea, amenorrhea, diarrhea, decreased appetite, constipation, arthralgia, vomiting, loss of taste, elevated CK, azotemia.    Previously discussed that if PET scan does not show metastatic disease and if disease can be encompassed by definitive RT, given patient's advanced age and comorbidities, will consider withholding systemic therapy until she develops more widespread \"measurable disease\".  On the other hand, if PET scan shows metastatic disease, will consider systemic therapy up-front    Continue management per PCP and other specialists  Schedule whole body PET scan in 23 weeks  Return to office in 24 weeks with preoffice CMP, iron, fe sat, ferritin, b12, folate and CBC w diff.      09/19/2023 - Appointment " with :  Follow up in 5 months     10/19/2023 - Appointment with :  PET scan completed 08/04/2023 revealed a decreasing size and intensity of FDG uptake of the right external auditory canal supporting response to current therapy. No evidence of regional lymphatic or distant metastasis.  I have reviewed the chart and other physicians records. she denies untoward side effects from treatment and without evidence for recurrent or metastatic disease on exam today. She will return in 2 months for follow up. Continue ongoing management per primary care physician and other specialists.  Plan:  Return in 2 months    02/01/2024 - PET Scan:    History obtained from  PATIENT, FAMILY, and CHART    PAST MEDICAL HISTORY  Past Medical History:   Diagnosis Date    Basal cell carcinoma (BCC) of jamari of right ear     Idiopathic thrombocytopenic purpura (ITP)     Kidney stones       PAST SURGICAL HISTORY  Past Surgical History:   Procedure Laterality Date    APPENDECTOMY      CHOLECYSTECTOMY      SHOULDER SURGERY Left       FAMILY HISTORY  family history is not on file.     SOCIAL HISTORY  Social History     Tobacco Use    Smoking status: Never    Smokeless tobacco: Never   Vaping Use    Vaping Use: Never used   Substance Use Topics    Alcohol use: Never    Drug use: Never     ALLERGIES  Tramadol     MEDICATIONS    Current Outpatient Medications:     acetaminophen (TYLENOL) 500 MG tablet, Take 1 tablet by mouth Every 6 (Six) Hours As Needed for Mild Pain., Disp: , Rfl:     ascorbic acid (VITAMIN C) 1000 MG tablet, Take 1 tablet by mouth Daily., Disp: , Rfl:     CALCIUM PO, Take 1 tablet by mouth Daily., Disp: , Rfl:     CRANBERRY PO, Take 1 tablet by mouth Daily., Disp: , Rfl:     ibuprofen (ADVIL,MOTRIN) 200 MG tablet, Take 1 tablet by mouth Every 6 (Six) Hours As Needed for Mild Pain., Disp: , Rfl:     Multiple Vitamins-Minerals (EMERGEN-C IMMUNE PLUS PO), Take 1 tablet by mouth Daily., Disp: , Rfl:     ofloxacin  "(FLOXIN) 0.3 % otic solution, INSTILL 4 DROPS INTO EAR(S) 2 TIMES DAILY AS DIRECTED FOR 25 DAYS, Disp: 10 mL, Rfl: 0    potassium chloride (K-DUR,KLOR-CON) 20 MEQ CR tablet, Take 1 tablet by mouth 2 (Two) Times a Day., Disp: , Rfl:     Vitamin D, Cholecalciferol, (CHOLECALCIFEROL) 10 MCG (400 UNIT) tablet, Take 1 tablet by mouth Daily., Disp: , Rfl:     Current outpatient and discharge medications have been reconciled for the patient.  Reviewed by: Clement Taylor III, MD    The following portions of the patient's history were reviewed and updated as appropriate: allergies, current medications, past family history, past medical history, past social history, past surgical history and problem list.    REVIEW OF SYSTEMS  Review of Systems  I have reviewed and confirmed the accuracy of the ROS as documented by the MA/JERARDON/RN Clement Taylor III, MD    PHYSICAL EXAM  VITAL SIGNS:   Vitals:    01/04/24 1019   BP: 93/50   Weight: 66.7 kg (147 lb)   Height: 167.6 cm (66\")   PainSc: 0-No pain         Physical Exam    Performance Status: ECOG (1) Restricted in physically strenuous activity, ambulatory and able to do work of light nature    Clinical Quality Measures  -Pain Documented by Standardized Tool, FPS Rosanna Chaparro reports a pain score of 0. Given her pain assessment as noted, treatment options were discussed and the following options were decided upon as a follow-up plan to address the patient's pain:  No pain, no plan given .   Pain Medications               acetaminophen (TYLENOL) 500 MG tablet Take 1 tablet by mouth Every 6 (Six) Hours As Needed for Mild Pain.    ibuprofen (ADVIL,MOTRIN) 200 MG tablet Take 1 tablet by mouth Every 6 (Six) Hours As Needed for Mild Pain.          -Advanced Care Planning Advance Care Planning  ACP discussion was held with the patient during this visit. Patient does not have an advance directive, information provided.     -Body Mass Index Screening and Follow-Up Plan BMI is " within normal parameters. No other follow-up for BMI required.    -Tobacco Use: Screening and Cessation Intervention Social History    Tobacco Use      Smoking status: Never      Smokeless tobacco: Never     ASSESSMENT AND PLAN  1. 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 History of radiation therapy    3. Non-smoker      RECOMMENDATIONS:    Rosanna Chaparro is status post completion of radiation therapy to right ear and presents to our clinic today for routine follow up exam. 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 She completed 7000 cGy in 35 fractions to the right ear on 03/06/2023.     PET scan scheduled on 02/01/2024.     I have spent 32 minutes in direct contact, reviewed the chart and other physicians records. she denies untoward side effects from treatment and without evidence for recurrent or metastatic disease on exam today. She will return in 6 months for follow up. Continue ongoing management per primary care physician and other specialists.    Patient Instructions   1) Return in 6 months     Clement Taylor III, MD  01/04/2024

## 2024-02-01 ENCOUNTER — HOSPITAL ENCOUNTER (OUTPATIENT)
Dept: CT IMAGING | Facility: HOSPITAL | Age: 84
Discharge: HOME | End: 2024-02-01
Payer: MEDICARE

## 2024-02-01 ENCOUNTER — TELEPHONE (OUTPATIENT)
Dept: ONCOLOGY | Facility: CLINIC | Age: 84
End: 2024-02-01
Payer: MEDICARE

## 2024-02-01 DIAGNOSIS — N20.0 CALCULUS, KIDNEY: Primary | ICD-10-CM

## 2024-02-01 DIAGNOSIS — C44.212 BASAL CELL CARCINOMA (BCC) OF SKIN OF RIGHT EAR: ICD-10-CM

## 2024-02-01 PROCEDURE — 0 FLUDEOXYGLUCOSE F18 SOLUTION: Performed by: INTERNAL MEDICINE

## 2024-02-01 PROCEDURE — 78816 PET IMAGE W/CT FULL BODY: CPT

## 2024-02-01 PROCEDURE — A9552 F18 FDG: HCPCS | Performed by: INTERNAL MEDICINE

## 2024-02-01 RX ADMIN — FLUDEOXYGLUCOSE F 18 1 DOSE: 200 INJECTION, SOLUTION INTRAVENOUS at 10:16

## 2024-02-01 NOTE — TELEPHONE ENCOUNTER
----- Message from Reynaldo Mock MD sent at 2/1/2024  3:31 PM CST -----  Pls refer to urology  ----- Message -----  From: Interface, [facility] [employer] [employer] [facility]  Sent: 2/1/2024   2:10 PM CST  To: Reynaldo Mock MD

## 2024-02-06 DIAGNOSIS — N20.0 KIDNEY STONE: Primary | ICD-10-CM

## 2024-02-13 NOTE — PROGRESS NOTES
"Subjective    Ms. hCaparro is 83 y.o. female    Chief Complaint: left ureteral stone    History of Present Illness    83-year-old female new patient referred for incidental finding of 10 mm left distal ureteral stone causing severe hydronephrosis found on recent PET/CT ordered by Dr. 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  Denies any flank pain or hematuria.  Reports a previous extensive history of kidney stones last treated 3 years ago in [facility] by Dr. Foley.  Where family states \"they went in and put a stent and cleaned her kidney out, all but leaving 1 stone that was embedded\".    The following portions of the patient's history were reviewed and updated as appropriate: allergies, current medications, past family history, past medical history, past social history, past surgical history and problem list.    Review of Systems   Constitutional:  Negative for chills and fever.   Gastrointestinal:  Negative for abdominal pain, anal bleeding, blood in stool, nausea and vomiting.   Genitourinary:  Negative for dysuria, flank pain, frequency, hematuria, pelvic pain and urgency.         Current Outpatient Medications:     acetaminophen (TYLENOL) 500 MG tablet, Take 1 tablet by mouth Every 6 (Six) Hours As Needed for Mild Pain., Disp: , Rfl:     ascorbic acid (VITAMIN C) 1000 MG tablet, Take 1 tablet by mouth Daily., Disp: , Rfl:     CALCIUM PO, Take 1 tablet by mouth Daily., Disp: , Rfl:     CRANBERRY PO, Take 1 tablet by mouth Daily., Disp: , Rfl:     ibuprofen (ADVIL,MOTRIN) 200 MG tablet, Take 1 tablet by mouth Every 6 (Six) Hours As Needed for Mild Pain., Disp: , Rfl:     Multiple Vitamins-Minerals (EMERGEN-C IMMUNE PLUS PO), Take 1 tablet by mouth Daily., Disp: , Rfl:     ofloxacin (FLOXIN) 0.3 % otic solution, INSTILL 4 DROPS INTO EAR(S) 2 " "TIMES DAILY AS DIRECTED FOR 25 DAYS, Disp: 10 mL, Rfl: 0    potassium chloride (K-DUR,KLOR-CON) 20 MEQ CR tablet, Take 1 tablet by mouth 2 (Two) Times a Day., Disp: , Rfl:     Vitamin D, Cholecalciferol, (CHOLECALCIFEROL) 10 MCG (400 UNIT) tablet, Take 1 tablet by mouth Daily., Disp: , Rfl:     Past Medical History:   Diagnosis Date    Basal cell carcinoma (BCC) of jamari of right ear     Idiopathic thrombocytopenic purpura (ITP)     Kidney stones        Past Surgical History:   Procedure Laterality Date    APPENDECTOMY      CHOLECYSTECTOMY      SHOULDER SURGERY Left        Social History     Socioeconomic History    Marital status:    Tobacco Use    Smoking status: Never    Smokeless tobacco: Never   Vaping Use    Vaping Use: Never used   Substance and Sexual Activity    Alcohol use: Never    Drug use: Never       History reviewed. No pertinent family history.    Objective    Temp 97.5 °F (36.4 °C)   Ht 167.6 cm (65.98\")   Wt 67.5 kg (148 lb 12.8 oz)   LMP  (LMP Unknown)   BMI 24.03 kg/m²     Physical Exam  Nursing note reviewed.   Constitutional:       General: She is not in acute distress.     Appearance: Normal appearance. She is not ill-appearing.   HENT:      Nose: No congestion.   Abdominal:      Tenderness: There is no right CVA tenderness or left CVA tenderness.      Hernia: No hernia is present.   Skin:     General: Skin is warm and dry.   Neurological:      Mental Status: She is alert and oriented to person, place, and time.   Psychiatric:         Mood and Affect: Mood normal.         Behavior: Behavior normal.             Results for orders placed or performed in visit on 02/14/24   POC Urinalysis Dipstick, Multipro    Specimen: Urine   Result Value Ref Range    Color Yellow Yellow, Straw, Dark Yellow, Mikaela    Clarity, UA Clear Clear    Glucose, UA Negative Negative mg/dL    Bilirubin Negative Negative    Ketones, UA 15 mg/dL (A) Negative    Specific Gravity  1.025 1.005 - 1.030    Blood, UA " Trace (A) Negative    pH, Urine 5.5 5.0 - 8.0    Protein, POC 30 mg/dL (A) Negative mg/dL    Urobilinogen, UA 0.2 E.U./dL Normal, 0.2 E.U./dL    Nitrite, UA Negative Negative    Leukocytes Small (1+) (A) Negative   KUB independent review    A KUB is available for me to review today.  The image is inspected for a bowel gas pattern and the general bone structure of the spine and pelvis. The kidneys are then inspected closely.  Renal outline is noted if identifiable. The kidney, collecting system, and anticipated path of the ureter are examined for calcifications including those in the true pelvis.  This film reveals:    On the right there are no calcificaitons seen in the kidney or the expected course of the ureter. .    On the left there are no calcificaitons seen in the kidney or the expected course of the ureter. .  NM PET/CT Whole Body (02/01/2024 11:32)   Assessment and Plan    Diagnoses and all orders for this visit:    1. Calculus, kidney (Primary)  -     CT Abdomen Pelvis Without Contrast; Future      KUB completed today no left-sided ureteral stone visualized.  There is a questionable area within the urinary bladder that appears to be within the urethra possibly.  Given this finding recommend further evaluation with CT stone protocol.  Denies any decreased urine output

## 2024-02-19 ENCOUNTER — TELEPHONE (OUTPATIENT)
Dept: UROLOGY | Facility: CLINIC | Age: 84
End: 2024-02-19
Payer: MEDICARE

## 2024-02-19 NOTE — TELEPHONE ENCOUNTER
Called Patient to remind them to get KUB prior to appointment.  NO Answer with Patient.  If patient calls back it is ok for the HUB to tell the pt the message.

## 2024-02-19 NOTE — PROGRESS NOTES
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  She has received definitive intent radiotherapy (7000 cGy in 35 daily fractions - 1/11/23-3/6/23). .  She is accompanied by her granddaughter, Keturah Bonner      I have reviewed the HPI and verified with the patient the accuracy of it. No changes to interval history since the information was documented. Reynaldo Mock MD 02/27/24      Diagnostic abnormalities:  --History includes diabetes, and was previously followed by Dr. Beth Richards, [facility] for chronic thrombocytopenia.  Question ITP vs MDS.  Last seen in office by Dr. Richards, 07/08/2017.  At that time platelets have been stable for the past few years without active bleeding.  Platelet count was 23,000,.  To a baseline of 30,000.  No aggressive treatments were pursued.  Previous bone marrow biopsy, 11/05/2012 showed hypercellular marrow showing maturing trilineage hematopoiesis with mild dyserythropoiesis and mild megakaryocytic atypia.  No increase in blasts.  Plan: If platelet continues to worsen drops to 10,000-20,000 or evidence of active bleeding then platelet transfusion can be tried and if this does not work then immunosuppressant such as prednisone will be next treatment option.  --07/29/2022-seen by PCP, Dr. Marvin Vasques.  Patient referred to ENT after right ear exam showed stenotic ear canal with a vestibule showing scaly plaques/mass/neoplasm?  --09/01/2022- seen for ENT by Dr. Cao.  Right ear scabby and draining puslike substance for the past 1 year.  Patient stated  that she had a lesion to her right ear for the past year that has progressively gotten larger, and had blistered few days prior before starting to drain.  --09/23/2022- CT temporal bones-1. Abnormal skin thickening and enhancement at the right acoustic meatus. Recommend clinical correlation/visual inspection in this area. The right external canal is near completely opacified with soft tissue debris. I do not see destructive bony change along the external canal. 2. Middle ear cavities and mastoids are clear. Ossicular chains are intact.3. Nearly dehiscent superior semicircular canals, bilateral.  -- 11/03/2022- 3 mm punch biopsy lesion from jamari right ear.  Microscopic diagnosis: Basal cell carcinoma-surrounded by scar tissue (shave specimen and superficial skin)  --11/28/2022 -CMP normal.  B12 1194, folate>20, ferritin 65, iron, iron saturation 20%, hemoglobin 11.9, hematocrit 38.8, MCV 90.9, WBC 8.84, platelets 48,000  -- 12/05/2022 - CT head- 1. Age-appropriate atrophy. 2. Otherwise normal CT of the brain with and without contrast.  -- 12/05/2022- CT soft tissue neck-  1. 2.0 x 1.8 x 1.2 cm enhancing skin lesion involving the jamari of the right ear. Up to 1.1 cm depth of invasion with the deep margin essentially abutting the bony elements of the external auditory canal along the roof and floor of the external canal. There is also a 7 mm depth of invasion inferiorly, with the lesion abutting the capsule of the superficial lobe of the right parotid gland. There is a 7 mm hyperenhancing lymph node in the upper level 2B neck. This is not technically enlarged by size criteria but this does appear to be hyperenhancing relative to the other cervical nodes and I'm suspicious that this is a leyda metastasis.  -- 12/05/2022- CT chest- 1. Rather benign-appearing subpleural pulmonary nodule in the right lower lobe which could be followed up on subsequent imaging. There is mild basilar atelectasis as well as linear scarring  or atelectasis in the left upper lobe. No evidence of consolidative pneumonia. 2. Heavy coronary calcifications are present. There is mild cardiomegaly. The thoracic aorta and great vessels are ectatic. 3. No convincing evidence of metastatic disease to the thorax. 4. Hiatal hernia. There is a cortical cyst of the upper pole of the left kidney.  -- 12/05/2022 - CT abdomen/pelvis- 1. No radiographic evidence of metastatic disease to the abdomen or pelvis. 2. Multiple compression deformities in the thoracic and lumbar spine. The fracture at L3 is more acute to subacute in appearance and should be correlated clinically. There is an accentuated thoracic kyphosis with mild gibbus deformity with the thoracolumbar juncture. 3. Cortical cyst upper pole left kidney. No evidence of nephrolithiasis or obstructive uropathy. 4. Moderate hiatal hernia. 5. Heavy atheromatous calcification of the abdominal aorta and iliac arteries with no evidence of aneurysm.  --12/09/2022- Consult-Dr. Rhodes of radiation oncology- RECOMMENDATIONS: Rosanna Chaparro has been referred to our office today to discuss radiotherapy recommendations for locally advanced high risk basal cell skin cancer of the right ear acoustic meatus obstructing the distal auditory canal.  Tumor burden includes a 2 cm tumor present at the acoustic meatus causing decreased unilatera hearing with up to 1.1 cm depth of invasion with the deep margin essentially abutting the bony elements of the external auditory canal along the roof and floor of the external canaliorly, with the lesion abutting the capsule of the superficial lobe of the right parotid gland. There is a 7 mm hyperenhancing lymph node in the upper level 2B neck.  Per radiology this is not technically enlarged by size criteria but this does appear to be hyperenhancing relative to the other cervical nodes and I'm suspicious that this is a leyda metastasis, thus T1N0 MX versus T1N1MX presentation.     Patient  is considering treatment options that have been offered to her including surgical resection and systemic therapy with vismodegib.  She presents today for consultation regarding radiotherapy options.     We have discussed the indications and rationale of radiation therapy according to the NCCN Guidelines.  Given a prominent ipsilateral neck lymph node that measures 7 mm with hyperintensity on CT scan, I discussed possibility of obtaining a biopsy or proceeding with PET scan to rule out regional leyda spread.  I do believe definitive intent radiotherapy will be a good option for her but she is also considering surgical and systemic therapy options.  I have extensively reviewed the risks, benefits and alternatives of therapy and progression of disease in spite of therapy with either local or systemic failure.   I have seen, examined and reviewed her medication list, appropriate labs and imaging studies as well as other physician notes. We discussed the goals/plans of care and answered all questions.      In summary, 3 options were presented to her which included needle biopsy of a prominent ipsilateral neck lymph node, which of positive may lend itself to additional treatment recommendations, versus PET scan staging, versus proceeding with definitive intent radiotherapy to include primary lesion and suspicious ipsilateral lymphatic region.  I explained the intent, benefits, course, precautions, and side effects of treatment which include worsening skin reactions including erythema and skin desquamation, radiation-induced hearing changes, radiation-induced salivary gland dysfunction which can lead to xerostomia, patchy alopecia, fatigue and possibly ipsilateral oropharyngeal mucositis.  Their questions were answered to their satisfaction and at the end of our discussion patient and her granddaughter wished to discuss the totality of treatment options presented to them with her family and call back with a decision.  If  "patient wishes to proceed with definitive intent radiotherapy, anticipate treating to a dose of 7000 cGy to be given over 35 daily fractions (Monday through Friday).  We will await patient call back with a decision.    --01/13/2023- PET scan:  1.  2.2 x 1.1 cm hypermetabolic soft tissue thickening in the RIGHT external auditory canal, correlating with biopsy-proven basal cell carcinoma. 2.  No evidence of hypermetabolic metastatic disease in the head, neck, chest, abdomen, pelvis, or bilateral lower extremities. 3.  Moderate size hiatal hernia    Previous interventions:  -- Definitive intent radiotherapy, 7000 cGy over 35 daily fractions (Monday- Friday).1/11/2023- 3/6/23    PAST MEDICAL HISTORY:  ALLERGIES:  Allergies   Allergen Reactions    Tramadol Mental Status Change     Made her \"crazy\"     CURRENT MEDICATIONS:  Outpatient Encounter Medications as of 2/27/2024   Medication Sig Dispense Refill    acetaminophen (TYLENOL) 500 MG tablet Take 1 tablet by mouth Every 6 (Six) Hours As Needed for Mild Pain.      ascorbic acid (VITAMIN C) 1000 MG tablet Take 1 tablet by mouth Daily.      CALCIUM PO Take 1 tablet by mouth Daily.      CRANBERRY PO Take 1 tablet by mouth Daily.      ibuprofen (ADVIL,MOTRIN) 200 MG tablet Take 1 tablet by mouth Every 6 (Six) Hours As Needed for Mild Pain.      Multiple Vitamins-Minerals (EMERGEN-C IMMUNE PLUS PO) Take 1 tablet by mouth Daily.      ofloxacin (FLOXIN) 0.3 % otic solution INSTILL 4 DROPS INTO EAR(S) 2 TIMES DAILY AS DIRECTED FOR 25 DAYS 10 mL 0    potassium chloride (K-DUR,KLOR-CON) 20 MEQ CR tablet Take 1 tablet by mouth 2 (Two) Times a Day.      Vitamin D, Cholecalciferol, (CHOLECALCIFEROL) 10 MCG (400 UNIT) tablet Take 1 tablet by mouth Daily.       No facility-administered encounter medications on file as of 2/27/2024.     Adult illnesses:  Diabetes mellitus  Chronic thrombocytopenia (ITP+/- MDS)  Former smoker  Basal cell carcinoma right ear    Past " surgeries:  Laparoscopic inguinal hernia repair  Appendectomy  Cholecystectomy, 2000  Renal lithotripsy x3 for renal stones, most recently 03/28/2018  Orthopedic surgery: Left shoulder fracture repair, 2018 with blood transfusion  Biopsy right ear jamari, 11/3/2022    ADULT ILLNESSES:  Patient Active Problem List   Diagnosis Code    Neoplasm of uncertain behavior of ear D48.5    Basal cell carcinoma C44.91    Non-smoker Z78.9    Thrombocytopenia D69.6    History of radiation therapy Z92.3    Basal cell carcinoma (BCC) of skin of ear C44.211     SURGERIES:  Past Surgical History:   Procedure Laterality Date    APPENDECTOMY      CHOLECYSTECTOMY      SHOULDER SURGERY Left      HEALTH MAINTENANCE ITEMS:  Health Maintenance Due   Topic Date Due    URINE MICROALBUMIN  Never done    DXA SCAN  Never done    Pneumococcal Vaccine 65+ (1 of 2 - PCV) Never done    TDAP/TD VACCINES (1 - Tdap) Never done    ZOSTER VACCINE (1 of 2) Never done    RSV Vaccine - Adults (1 - 1-dose 60+ series) Never done    COVID-19 Vaccine (3 - Moderna risk series) 05/03/2021    ANNUAL WELLNESS VISIT  Never done    HEMOGLOBIN A1C  Never done    DIABETIC EYE EXAM  Never done    INFLUENZA VACCINE  Never done       <no information>  Last Completed Colonoscopy       This patient has no relevant Health Maintenance data.            There is no immunization history on file for this patient.  Last Completed Mammogram       This patient has no relevant Health Maintenance data.              FAMILY HISTORY:  History reviewed. No pertinent family history.  SOCIAL HISTORY:  Social History     Socioeconomic History    Marital status:    Tobacco Use    Smoking status: Never    Smokeless tobacco: Never   Vaping Use    Vaping Use: Never used   Substance and Sexual Activity    Alcohol use: Never    Drug use: Never       REVIEW OF SYSTEMS:  Review of Systems   Constitutional: Negative.  Negative for fatigue.        Manages her personal ADLs, house chores, but  "does not run errands on her own and has not been driving. \"My  won't let me.\" Is up and about \"most all the time.\" Has a rolling walker at home that she only occasionally uses.  \"I just have it for balance.\"   HENT:  Positive for hearing loss (Non-healing lesion in the right ear, \"looks scabbed over.\").    Eyes: Negative.    Respiratory: Negative.     Cardiovascular: Negative.    Gastrointestinal: Negative.    Endocrine: Negative.    Genitourinary: Negative.    Musculoskeletal:  Negative for arthralgias.   Skin:  Positive for skin lesions (right ear canal lesion).   Allergic/Immunologic: Negative.    Neurological: Negative.    Hematological: Negative.    Psychiatric/Behavioral: Negative.         /64   Pulse 96   Temp 98.7 °F (37.1 °C) (Temporal)   Resp 18   Ht 167.6 cm (66\")   Wt 67.6 kg (149 lb)   LMP  (LMP Unknown)   SpO2 97%   BMI 24.05 kg/m²  Body surface area is 1.77 meters squared.  Pain Score    02/27/24 1032   PainSc: 0-No pain             Physical Exam  Vitals reviewed.   Constitutional:       Comments: Pleasant, cooperative, modestly kept, heavy-set elderly female.  Brought in by wheelchair but is otherwise ambulatory.  ECOG 0-1.      Has lost 1 lb (in addition to 14 lb at her 2 prior visits) since the last visit   HENT:      Head: Normocephalic and atraumatic.      Ears:      Comments: Again note stenotic ear canal with acoustic meatus again notable for resolution of pigmentation anteriorly and posteriorly since receipt of RT.  RT associated skin hyperemia surrounding the lesion is less prominent  Eyes:      General: No scleral icterus.     Extraocular Movements: Extraocular movements intact.      Conjunctiva/sclera: Conjunctivae normal.      Pupils: Pupils are equal, round, and reactive to light.   Cardiovascular:      Rate and Rhythm: Normal rate.   Pulmonary:      Effort: Pulmonary effort is normal.   Abdominal:      Palpations: Abdomen is soft.      Tenderness: There is no " abdominal tenderness.   Musculoskeletal:         General: Normal range of motion.      Cervical back: Normal range of motion.   Lymphadenopathy:      Head:      Right side of head: No submental, submandibular, preauricular, posterior auricular or occipital adenopathy.      Left side of head: No submental, submandibular, preauricular, posterior auricular or occipital adenopathy.      Cervical: No cervical adenopathy.      Right cervical: No superficial, deep or posterior cervical adenopathy.     Left cervical: No superficial, deep or posterior cervical adenopathy.      Upper Body:      Right upper body: No supraclavicular or axillary adenopathy.      Left upper body: No supraclavicular or axillary adenopathy.   Skin:     Findings: Lesion (right ear canal) present.   Neurological:      General: No focal deficit present.      Mental Status: She is alert and oriented to person, place, and time.   Psychiatric:         Mood and Affect: Mood normal.         Thought Content: Thought content normal.         Assessment:  1.   Basal cell carcinoma of the right ear acoustic meatus  -- Tumor stage: cT1, cN0   -- Tumor burden/complications:  2 cm tumor present at the acoustic meatus causing decreased unilateral hearing with up to 1.1 cm depth of invasion with the deep margin essentially abutting the bony elements of the external auditory canal along the roof and floor of the external canaliorly, with the lesion abutting the capsule of the superficial lobe of the right parotid gland. There is a 7 mm hyperenhancing lymph node in the upper level 2B neck.  Per radiology this is not technically enlarged by size criteria but this does appear to be hyperenhancing relative to the other cervical nodes suspicious for leyda metastasis  -- High risk of recurrence (lesion on head and neck).  Not felt to be excisional candidate due to locally advanced lesion.  -- Up-to-date algorithm for nonsurgical candidates recommends primary radiation  therapy (preferred) or hedgehog pathway inhibitor (preferred) or immune checkpoint inhibitor (cemiplimab).  Clinical follow-up for recurrence every 6-12 months.  --01/13/2023- PET scan:  1.  2.2 x 1.1 cm hypermetabolic soft tissue thickening in the RIGHT external auditory canal, correlating with biopsy-proven basal cell carcinoma. 2.  No evidence of hypermetabolic metastatic disease in the head, neck, chest, abdomen, pelvis, or bilateral lower extremities. 3.  Moderate size hiatal hernia  -- Definitive intent radiotherapy (7000 cGy in 35 daily fractions - 1/11/23- 3/6/23)  --8/4/23- PET scan- Decreasing size and intensity of FDG uptake of the right external auditory canal supporting response to current therapy. No evidence of regional lymphatic or distant metastasis.  --2/1/24- PET scan-No evidence of metastatic disease. Similar-appearing small focus of slightly increased/asymmetric metabolic activity within the anterior right external auditory canal, with activity below liver background level. This low-level activity may be posttreatment inflammatory change but residual active neoplasm is not excluded. Degree of uptake is similar to slightly decreased from the PET/CT performed in August 2023 and markedly decreased from the PET/CT performed January 2023. 10 mm calculus at the left ureterovesical junction causing severe left-sided hydronephrosis.  --2/20/24- Urology consult-A/P:  Calculus, kidney (Primary)-  KUB completed today no left-sided ureteral stone visualized.  There is a questionable area within the urinary bladder that appears to be within the urethra possibly.  Given this finding recommend further evaluation with CT stone protocol.  Denies any decreased urine output  --2/20/24- CT abdomen/pelvis wo contrast-1. Prior cholecystectomy. 2. Atheromatous disease of the aortoiliac vessels and coronary arteries. Borderline cardiomegaly. 3. A 5.3 cm exophytic cyst arising from the upper pole left kidney. No  additional follow-up recommended. There is a 2 mm nonobstructive stone in the upper pole left kidney. There is mild dilatation of the left renal collecting system and proximal left ureter. No visible ureteral  stone is identified. 4. A 1.3 cm stone is seen in the urinary bladder. 5. Moderate size hiatal hernia. Diverticulosis of the colon. Normal appendix. 6. Possible leiomyoma in the uterine fundus. 7. Multiple compression deformities. New compression deformity of the superior endplate of L2 is likely subacute in age. There is some  sclerotic change of the superior endplate.    2.   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 Beth Richards, [facility] for chronic thrombocytopenia.  Question ITP vs MDS.  Last seen in office by Dr. Richards, 07/08/2017.  At that time platelets have been stable for the past few years without active bleeding.  Platelet count was 23,000,.  To a baseline of 30,000.  No aggressive treatments were pursued.  Previous bone marrow biopsy, 11/05/2012 showed hypercellular marrow showing maturing trilineage hematopoiesis with mild dyserythropoiesis and mild megakaryocytic atypia.  No increase in blasts.    --8/24/23- Peripheral blood smear, pathologist review: Severe thrombocytopenia.  Mild normocytic anemia. No circulating blasts or schistocytes.  Otherwise within normal limits.   --8/24/23- Platelet Abs- negative  --2/27/24- platelets 24,000 (ramon: 23,000 per records)  3.   Mild normocytic anemia. MDS?  --Stable.  Hgb 11.2, 2/27/24 (prior:  Hgb 11.6-11.9)  4.   Diabetes mellitus.  5.   Advanced age    Plan:  1.   Apprised of labs, 2/27/24- glucose 168 otherwise normal CMP, stable anemia, stable thrombocytopenia (24,000), normal WBC    2.   Review labs, 8/24/23:  Iron 77, fe sat 22%, ferritin 63 (L), B12 625, folate>20, anti-platelets antibodies -negative.  Manual smear review by pathology:  No schistocytes    3.  Urology consult, 2/20/24 with KUB  "and Ct a/p (above)   4.   Apprised of the PET scan, 2/1/24 (above).  No mets. Calculus left UVJ with severe left-sided hydronephrosis. Referred to urology  5.   Review progress note from Dr. Taylor, 1/4/24 - A/P: Locally advanced high risk basal cell skin cancer of the right ear acoustic meatus obstructing the distal auditory canal. She completed 7000 cGy in 35 fractions to the right ear on 03/06/2023.  PET scan scheduled on 02/01/2024.  Without evidence for recurrent or metastatic disease on exam today. She will return in 6 months for follow up.     6.   Erivedge (vismodegib).  Potential toxicities again discussed (to include but not limited to: Liver injury, azotemia, hyponatremia, hypokalemia, Andres-Gulshan syndrome, toxic epidermal necrolysis, muscle spasms, alopecia, dysgeusia, weight loss, fatigue, nausea, amenorrhea, diarrhea, decreased appetite, constipation, arthralgia, vomiting, loss of taste, elevated CK, azotemia.    7.  Previously discussed that if PET scan does not show metastatic disease and if disease can be encompassed by definitive RT, given patient's advanced age and comorbidities, will consider withholding systemic therapy until she develops more widespread \"measurable disease\".  On the other hand, if PET scan shows metastatic disease, will consider systemic therapy up-front    8.   Continue management per PCP and other specialists- Follow -up ENT (missed last 2/20/24) moved to ~ 3/6/24  9.   Schedule whole body PET scan in 23 weeks  10.   Return to office in 24 weeks with preoffice CMP, iron, fe sat, ferritin, b12, folate and CBC w diff.      I spent ~45 minutes caring for  Rosanna on this date of service. This time includes time spent by me in the following activities: preparing for the visit, reviewing tests, performing a medically appropriate examination and/or evaluation, counseling and educating the patient/family/caregiver, ordering medications, tests, or procedures and documenting " information in the medical record.

## 2024-02-20 ENCOUNTER — OFFICE VISIT (OUTPATIENT)
Dept: UROLOGY | Facility: CLINIC | Age: 84
End: 2024-02-20
Payer: MEDICARE

## 2024-02-20 ENCOUNTER — HOSPITAL ENCOUNTER (OUTPATIENT)
Dept: GENERAL RADIOLOGY | Facility: HOSPITAL | Age: 84
Discharge: HOME | End: 2024-02-20
Payer: MEDICARE

## 2024-02-20 ENCOUNTER — HOSPITAL ENCOUNTER (OUTPATIENT)
Dept: CT IMAGING | Facility: HOSPITAL | Age: 84
Discharge: HOME | End: 2024-02-20
Payer: MEDICARE

## 2024-02-20 VITALS — HEIGHT: 66 IN | TEMPERATURE: 97.5 F | BODY MASS INDEX: 23.91 KG/M2 | WEIGHT: 148.8 LBS

## 2024-02-20 DIAGNOSIS — N20.0 CALCULUS, KIDNEY: Primary | ICD-10-CM

## 2024-02-20 DIAGNOSIS — N20.0 CALCULUS, KIDNEY: ICD-10-CM

## 2024-02-20 DIAGNOSIS — N20.0 KIDNEY STONE: ICD-10-CM

## 2024-02-20 PROCEDURE — 74018 RADEX ABDOMEN 1 VIEW: CPT

## 2024-02-20 PROCEDURE — 74176 CT ABD & PELVIS W/O CONTRAST: CPT

## 2024-02-22 ENCOUNTER — TELEPHONE (OUTPATIENT)
Dept: UROLOGY | Facility: CLINIC | Age: 84
End: 2024-02-22
Payer: MEDICARE

## 2024-02-22 NOTE — TELEPHONE ENCOUNTER
----- Message from PRATIBHA Murphy sent at 2/22/2024  9:56 AM CST -----  Stone appears to now be within the bladder. Will likely need to have treated If not emptying bladder well. Please find out if pt preference on provider

## 2024-02-22 NOTE — PROGRESS NOTES
Stone appears to now be within the bladder. Will likely need to have treated If not emptying bladder well. Please find out if pt preference on provider

## 2024-02-22 NOTE — TELEPHONE ENCOUNTER
Left patients daughter a voicemail to let her know CT scan results  Stone appears to now be within the bladder. Will likely need to have treated If not emptying bladder well. Please find out if pt preference on provider     In the voicemail I let them know to call us back so we could discuss this scan and get the preferred provider if patient was to need surgery.    HUB can read

## 2024-02-27 ENCOUNTER — LAB (OUTPATIENT)
Dept: LAB | Facility: HOSPITAL | Age: 84
End: 2024-02-27
Payer: MEDICARE

## 2024-02-27 ENCOUNTER — OFFICE VISIT (OUTPATIENT)
Dept: ONCOLOGY | Facility: CLINIC | Age: 84
End: 2024-02-27
Payer: MEDICARE

## 2024-02-27 VITALS
HEIGHT: 66 IN | HEART RATE: 96 BPM | WEIGHT: 149 LBS | DIASTOLIC BLOOD PRESSURE: 64 MMHG | RESPIRATION RATE: 18 BRPM | BODY MASS INDEX: 23.95 KG/M2 | SYSTOLIC BLOOD PRESSURE: 100 MMHG | TEMPERATURE: 98.7 F | OXYGEN SATURATION: 97 %

## 2024-02-27 DIAGNOSIS — C44.212 BASAL CELL CARCINOMA (BCC) OF SKIN OF RIGHT EAR: Primary | ICD-10-CM

## 2024-02-27 LAB
ALBUMIN SERPL-MCNC: 3.9 G/DL (ref 3.5–5.2)
ALBUMIN/GLOB SERPL: 1.3 G/DL
ALP SERPL-CCNC: 67 U/L (ref 39–117)
ALT SERPL W P-5'-P-CCNC: 12 U/L (ref 1–33)
ANION GAP SERPL CALCULATED.3IONS-SCNC: 12 MMOL/L (ref 5–15)
AST SERPL-CCNC: 18 U/L (ref 1–32)
BILIRUB SERPL-MCNC: 0.3 MG/DL (ref 0–1.2)
BUN SERPL-MCNC: 20 MG/DL (ref 8–23)
BUN/CREAT SERPL: 21.5 (ref 7–25)
CALCIUM SPEC-SCNC: 9.1 MG/DL (ref 8.6–10.5)
CHLORIDE SERPL-SCNC: 104 MMOL/L (ref 98–107)
CO2 SERPL-SCNC: 24 MMOL/L (ref 22–29)
CREAT SERPL-MCNC: 0.93 MG/DL (ref 0.57–1)
DEPRECATED RDW RBC AUTO: 45.5 FL (ref 37–54)
EGFRCR SERPLBLD CKD-EPI 2021: 61.1 ML/MIN/1.73
EOSINOPHIL # BLD MANUAL: 0.25 10*3/MM3 (ref 0–0.4)
EOSINOPHIL NFR BLD MANUAL: 4 % (ref 0.3–6.2)
ERYTHROCYTE [DISTWIDTH] IN BLOOD BY AUTOMATED COUNT: 14.2 % (ref 12.3–15.4)
GIANT PLATELETS: ABNORMAL
GLOBULIN UR ELPH-MCNC: 2.9 GM/DL
GLUCOSE SERPL-MCNC: 168 MG/DL (ref 65–99)
HCT VFR BLD AUTO: 35.5 % (ref 34–46.6)
HGB BLD-MCNC: 11.2 G/DL (ref 12–15.9)
HOLD SPECIMEN: NORMAL
HOLD SPECIMEN: NORMAL
LYMPHOCYTES # BLD MANUAL: 1.31 10*3/MM3 (ref 0.7–3.1)
LYMPHOCYTES NFR BLD MANUAL: 3 % (ref 5–12)
MCH RBC QN AUTO: 27.5 PG (ref 26.6–33)
MCHC RBC AUTO-ENTMCNC: 31.5 G/DL (ref 31.5–35.7)
MCV RBC AUTO: 87.2 FL (ref 79–97)
MONOCYTES # BLD: 0.19 10*3/MM3 (ref 0.1–0.9)
NEUTROPHILS # BLD AUTO: 4.56 10*3/MM3 (ref 1.7–7)
NEUTROPHILS NFR BLD MANUAL: 71.3 % (ref 42.7–76)
NEUTS BAND NFR BLD MANUAL: 1 % (ref 0–5)
OVALOCYTES BLD QL SMEAR: ABNORMAL
PLATELET # BLD AUTO: 24 10*3/MM3 (ref 140–450)
PMV BLD AUTO: 11.9 FL (ref 6–12)
POIKILOCYTOSIS BLD QL SMEAR: ABNORMAL
POLYCHROMASIA BLD QL SMEAR: ABNORMAL
POTASSIUM SERPL-SCNC: 4 MMOL/L (ref 3.5–5.2)
PROT SERPL-MCNC: 6.8 G/DL (ref 6–8.5)
RBC # BLD AUTO: 4.07 10*6/MM3 (ref 3.77–5.28)
SMALL PLATELETS BLD QL SMEAR: ABNORMAL
SODIUM SERPL-SCNC: 140 MMOL/L (ref 136–145)
VARIANT LYMPHS NFR BLD MANUAL: 20.8 % (ref 19.6–45.3)
WBC MORPH BLD: NORMAL
WBC NRBC COR # BLD AUTO: 6.31 10*3/MM3 (ref 3.4–10.8)

## 2024-02-27 PROCEDURE — 85007 BL SMEAR W/DIFF WBC COUNT: CPT

## 2024-02-27 PROCEDURE — 80053 COMPREHEN METABOLIC PANEL: CPT

## 2024-02-27 PROCEDURE — 85025 COMPLETE CBC W/AUTO DIFF WBC: CPT

## 2024-02-27 PROCEDURE — 36415 COLL VENOUS BLD VENIPUNCTURE: CPT

## 2024-05-06 ENCOUNTER — OFFICE VISIT (OUTPATIENT)
Dept: OTOLARYNGOLOGY | Facility: CLINIC | Age: 84
End: 2024-05-06
Payer: MEDICARE

## 2024-05-06 VITALS
HEIGHT: 66 IN | SYSTOLIC BLOOD PRESSURE: 100 MMHG | DIASTOLIC BLOOD PRESSURE: 67 MMHG | WEIGHT: 149 LBS | BODY MASS INDEX: 23.95 KG/M2 | TEMPERATURE: 97.8 F | HEART RATE: 88 BPM | RESPIRATION RATE: 20 BRPM

## 2024-05-06 DIAGNOSIS — H90.3 SENSORINEURAL HEARING LOSS, BILATERAL: ICD-10-CM

## 2024-05-06 DIAGNOSIS — C44.212 BASAL CELL CARCINOMA (BCC) OF SKIN OF RIGHT EAR: Primary | ICD-10-CM

## 2024-05-06 DIAGNOSIS — H61.23 BILATERAL IMPACTED CERUMEN: ICD-10-CM

## 2024-05-06 DIAGNOSIS — H91.93 DECREASED HEARING OF BOTH EARS: ICD-10-CM

## 2024-05-06 PROCEDURE — 99213 OFFICE O/P EST LOW 20 MIN: CPT | Performed by: NURSE PRACTITIONER

## 2024-05-06 PROCEDURE — 1159F MED LIST DOCD IN RCRD: CPT | Performed by: NURSE PRACTITIONER

## 2024-05-06 PROCEDURE — 1160F RVW MEDS BY RX/DR IN RCRD: CPT | Performed by: NURSE PRACTITIONER

## 2024-05-06 PROCEDURE — 69210 REMOVE IMPACTED EAR WAX UNI: CPT | Performed by: NURSE PRACTITIONER

## 2024-08-06 ENCOUNTER — HOSPITAL ENCOUNTER (OUTPATIENT)
Dept: CT IMAGING | Facility: HOSPITAL | Age: 84
Discharge: HOME | End: 2024-08-06
Payer: MEDICARE

## 2024-08-06 DIAGNOSIS — C44.212 BASAL CELL CARCINOMA (BCC) OF SKIN OF RIGHT EAR: ICD-10-CM

## 2024-08-06 PROCEDURE — 78816 PET IMAGE W/CT FULL BODY: CPT

## 2024-08-06 PROCEDURE — A9552 F18 FDG: HCPCS | Performed by: INTERNAL MEDICINE

## 2024-08-06 PROCEDURE — 0 FLUDEOXYGLUCOSE F18 SOLUTION: Performed by: INTERNAL MEDICINE

## 2024-08-06 RX ADMIN — FLUDEOXYGLUCOSE F 18 1 DOSE: 200 INJECTION, SOLUTION INTRAVENOUS at 09:12

## 2024-08-07 NOTE — PROGRESS NOTES
[facility]  Radiation Oncology Clinic   MD Shankar Feldman MD Casey Foster, APRN  _______________________________________________  [facility]  Department of Radiation Oncology  [address] [place] [id number]  Office: [phone]  Fax: [phone]    DATE: 08/09/2024  PATIENT: Rosanna Chaparro  1940                         MEDICAL RECORD #: [id number]    1. 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 History of radiation therapy    3. Non-smoker                                     REASON FOR VISIT:    Chief Complaint   Patient presents with    Basal Cell Carcinoma     Right ear     Reason for Visit: Rosanna Chaparro is a very pleasant 84 y.o. female that has completed radiation therapy to the right ear and returns to the clinic today for routine follow up exam.     History of Present Illness  Diagnosed with locally advanced high risk basal cell skin cancer of the right ear acoustic meatus obstructing the distal auditory canal. She completed 7000 cGy in 35 fractions to the right ear on 03/06/2023.     09/01/2022 - Appointment with Timmy Nunes APRN - Otolaryngology:  She complains of a lesionright ear present for 1 year(s)  Patient states she has had a lesion to her right ear for the past year that has progressively gotten larger. She states she had a blistered area a couple days ago that started draining.   Plan:  ct temporal bones prior to follow up   Follow up in 4 weeks and we will discuss excision of lesion   Follow up in about 4 weeks (around 9/29/2022).     09/23/2022 - CT Temporal Bones due to hearing loss:  Abnormal skin thickening and enhancement at the right acoustic meatus. Recommend clinical correlation/visual inspection in this area. The right external canal is near completely opacified with soft tissue debris. I do not see destructive bony change along the external canal.  Middle ear  cavities and mastoids are clear. Ossicular chains are intact.  Nearly dehiscent superior semicircular canals, bilateral.    09/27/2022 - Appointment with Timmy Nunes APRN - Otolaryngology:  Discussed surgical removal options vs medical management with patient. She would like to proceed with referral to Dr. Ha for treatment with Eriveage.  Dr. Cao examined and discussed care with patient and agrees with treatment plan.   Return in about 6 months (around 3/27/2023) for Recheck.    10/04/2022 - Documentation per Otolaryngology group:  Patient needs biospy of lesion per Dr. 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  Not felt to be excisional candidate due to locally advanced lesion.  Up-to-date algorithm for nonsurgical candidates recommends primary radiation therapy (preferred) or hedgehog pathway inhibitor (preferred) or immune checkpoint inhibitor (cemiplimab).  Clinical follow-up for current every 6-12 months.  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 Beth Richards, [facility] for chronic thrombocytopenia.  Question ITP vs MDS.  Last seen in office by Dr. Richards, 07/08/2017.  At that time platelets have been stable for the past few years without active bleeding.  Platelet count was 23,000,.  To a baseline of 30,000.  No aggressive treatments were pursued.  Previous bone marrow biopsy, 11/05/2012 showed hypercellular marrow showing maturing trilineage hematopoiesis with mild dyserythropoiesis and mild megakaryocytic atypia.  No increase in blasts.    Diabetes mellitus.  Advanced age  Plan:  Apprised of the available diagnostic  information.  Draw baseline CBC with differential, iron, iron saturation, ferritin, B12, folate, CMP  Refer to Dr. Taylor Re: Assess for definitive radiation  Teaching sheets for Erivedge (vismodegib).  Potential toxicities discussed (to include but not limited to: Liver injury, azotemia, hyponatremia, hypokalemia, Andres-Gulshan syndrome, toxic epidermal necrolysis, muscle spasms, alopecia, dysgeusia, weight loss, fatigue, nausea, amenorrhea, diarrhea, decreased appetite, constipation, arthralgia, vomiting, loss of taste, elevated CK, azotemia.  Schedule CT head, soft tissues of the neck, chest, abdomen/pelvis with p.o. and IV contrast.    Continue management per PCP and other specialists  Return to office in 4 weeks.  Further management recommendations pending.      12/05/2022 - CT Soft Tissue Neck with contrast:  2.0 x 1.8 x 1.2 cm enhancing skin lesion involving the jamari of the right ear. Up to 1.1 cm depth of invasion with the deep margin essentially abutting the bony elements of the external auditory canal along the roof and floor of the external canal.   There is also a 7 mm depth of invasion inferiorly, with the lesion abutting the capsule of the superficial lobe of the right parotid gland.   There is a 7 mm hyperenhancing lymph node in the upper level 2B neck. This is not technically enlarged by size criteria but this does appear to be hyperenhancing relative to the other cervical nodes and I'm suspicious that this is a leyda metastasis.    12/05/2022 - CT Chest with contrast:  Rather benign-appearing subpleural pulmonary nodule in the right lower lobe which could be followed up on subsequent imaging. There is mild basilar atelectasis as well as linear scarring or atelectasis in the left upper lobe. No evidence of consolidative pneumonia.  Heavy coronary calcifications are present. There is mild cardiomegaly. The thoracic aorta and great vessels are ectatic.  No convincing evidence of metastatic disease  to the thorax.  Hiatal hernia. There is a cortical cyst of the upper pole of the left kidney.    12/05/2022 - CT Abdomen/Pelvis with contrast:  No radiographic evidence of metastatic disease to the abdomen or pelvis.  Multiple compression deformities in the thoracic and lumbar spine. The fracture at L3 is more acute to subacute in appearance and should be correlated clinically. There is an accentuated thoracic kyphosis with mild gibbus deformity with the thoracolumbar juncture.  Cortical cyst upper pole left kidney. No evidence of nephrolithiasis or obstructive uropathy.  Moderate hiatal hernia.  Heavy atheromatous calcification of the abdominal aorta and iliac arteries with no evidence of aneurysm.    12/05/2022 - CT Head with and without contrast:  Age-appropriate atrophy.    12/06/2022 -Documentation per :  Pls confirm that pt has a referral to dr bonilla or dr sr of RT    12/09/2022 - Consult with  (Covering for ):  Patient is considering treatment options that have been offered to her including surgical resection and systemic therapy with vismodegib.  She presents today for consultation regarding radiotherapy options.  We have discussed the indications and rationale of radiation therapy according to the NCCN Guidelines.  Given a prominent ipsilateral neck lymph node that measures 7 mm with hyperintensity on CT scan, I discussed possibility of obtaining a biopsy or proceeding with PET scan to rule out regional leyda spread.  I do believe definitive intent radiotherapy will be a good option for her but she is also considering surgical and systemic therapy options.  I have extensively reviewed the risks, benefits and alternatives of therapy and progression of disease in spite of therapy with either local or systemic failure.   I have seen, examined and reviewed her medication list, appropriate labs and imaging studies as well as other physician notes. We discussed the  goals/plans of care and answered all questions.   In summary, 3 options were presented to her which included needle biopsy of a prominent ipsilateral neck lymph node, which of positive may lend itself to additional treatment recommendations, versus PET scan staging, versus proceeding with definitive intent radiotherapy to include primary lesion and suspicious ipsilateral lymphatic region.  I explained the intent, benefits, course, precautions, and side effects of treatment which include worsening skin reactions including erythema and skin desquamation, radiation-induced hearing changes, radiation-induced salivary gland dysfunction which can lead to xerostomia, patchy alopecia, fatigue and possibly ipsilateral oropharyngeal mucositis.  Their questions were answered to their satisfaction and at the end of our discussion patient and her granddaughter wished to discuss the totality of treatment options presented to them with her family and call back with a decision.  If patient wishes to proceed with definitive intent radiotherapy, anticipate treating to a dose of 7000 cGy to be given over 35 daily fractions (Monday through Friday).  We will await patient call back with a decision.  In the meantime, she was asked to follow-up with her other healthcare providers for routine medical care as per their scheduling.    12/14/2022 - Documentation per  (Covering for ):  Patient called back to schedule CT simulation wishing to move forward with definitive intent radiotherapy as previously discussed.  They declined to pursue additional work-up by way of PET scan or lymph node biopsy wanting to only pursue treatment at this time.  PLAN:  We will proceed with CT simulation today in anticipation of definitive intent radiotherapy to a dose of 7000 cGy to be given over 35 daily fractions and will make efforts to include the ipsilateral prominent lymph node previously discussed..    01/11/2023 - 03/06/2023 -  Completed radiation course:  Received 7000 cGy in 35 fractions to the right ear.    01/13/2023 - PET scan:  2.2 x 1.1 cm hypermetabolic soft tissue thickening in the RIGHT external auditory canal, correlating with biopsy-proven basal cell carcinoma.  No evidence of hypermetabolic metastatic disease in the head, neck, chest, abdomen, pelvis, or bilateral lower extremities.  Moderate size hiatal hernia.    02/24/2023 - Appointment with :  Assessment:  Basal cell carcinoma of the right ear acoustic meatus  Tumor stage: cT1, cN0   Tumor burden/complications:  2 cm tumor present at the acoustic meatus causing decreased unilateral hearing with up to 1.1 cm depth of invasion with the deep margin essentially abutting the bony elements of the external auditory canal along the roof and floor of the external canaliorly, with the lesion abutting the capsule of the superficial lobe of the right parotid gland. There is a 7 mm hyperenhancing lymph node in the upper level 2B neck.  Per radiology this is not technically enlarged by size criteria but this does appear to be hyperenhancing relative to the other cervical nodes suspicious for leyda metastasis  High risk of recurrence (lesion on head and neck).  Not felt to be excisional candidate due to locally advanced lesion.  Up-to-date algorithm for nonsurgical candidates recommends primary radiation therapy (preferred) or hedgehog pathway inhibitor (preferred) or immune checkpoint inhibitor (cemiplimab).  Clinical follow-up for recurrence every 6-12 months.  --01/13/2023- PET scan:  1.  2.2 x 1.1 cm hypermetabolic soft tissue thickening in the RIGHT external auditory canal, correlating with biopsy-proven basal cell carcinoma. 2.  No evidence of hypermetabolic metastatic disease in the head, neck, chest, abdomen, pelvis, or bilateral lower extremities. 3.  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 Beth Richards, [facility] for chronic thrombocytopenia.  Question ITP vs MDS.  Last seen in office by Dr. Richards, 07/08/2017.  At that time platelets have been stable for the past few years without active bleeding.  Platelet count was 23,000,.  To a baseline of 30,000.  No aggressive treatments were pursued.  Previous bone marrow biopsy, 11/05/2012 showed hypercellular marrow showing maturing trilineage hematopoiesis with mild dyserythropoiesis and mild megakaryocytic atypia.  No increase in blasts.    --11/28/2022- platelets 48,000 (ramon: 23,000 per records)  Diabetes mellitus.  Advanced age  Plan:  Apprised of the PET scan, 1/13/2023 (above).  No mets  Continue definitive intent radiotherapy (anticipate 7000 cGy in 35 daily fractions - 1/11-present- 27/35 fx)  Erivedge (vismodegib).  Potential toxicities again discussed (to include but not limited to: Liver injury, azotemia, hyponatremia, hypokalemia, Andres-Gulshna syndrome, toxic epidermal necrolysis, muscle spasms, alopecia, dysgeusia, weight loss, fatigue, nausea, amenorrhea, diarrhea, decreased appetite, constipation, arthralgia, vomiting, loss of taste, elevated CK, azotemia.    Discussed that if PET scan does not show metastatic disease and if disease can be encompassed by definitive RT, given patient's advanced age and comorbidities, will consider withholding systemic therapy until she develops more widespread \"measurable disease\".  On the other hand, if PET scan shows metastatic disease, will consider systemic therapy up-front    Continue management per PCP and other specialists  Schedule whole body PET scan in 23 weeks  Return to office in 24 weeks with preoffice CMP and CBC w diff.      04/20/2023 - Appointment with :  Rosanna Phillips [place] is status post completion of radiation therapy to right ear and presents to our clinic " today for inital follow up exam. 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 She completed 7000 cGy in 35 fractions to the right ear on 03/06/2023.   I have reviewed the chart and other physicians records. she denies untoward side effects from treatment and without evidence for recurrent or metastatic disease on exam today.   She will return in 3 months for follow up.   Continue ongoing management per primary care physician and other specialists.  Plan:  Return in 3 months     07/20/2023 - Appointment with :  Follow up in 3 months     08/04/2023 - PET scan:  Decreasing size and intensity of FDG uptake of the right external auditory canal supporting response to current therapy.   No evidence of regional lymphatic or distant metastasis.    08/24/2023 - Appointment with :  Assessment:  Basal cell carcinoma of the right ear acoustic meatus  Tumor stage: cT1, cN0   Tumor burden/complications:  2 cm tumor present at the acoustic meatus causing decreased unilateral hearing with up to 1.1 cm depth of invasion with the deep margin essentially abutting the bony elements of the external auditory canal along the roof and floor of the external canaliorly, with the lesion abutting the capsule of the superficial lobe of the right parotid gland. There is a 7 mm hyperenhancing lymph node in the upper level 2B neck.  Per radiology this is not technically enlarged by size criteria but this does appear to be hyperenhancing relative to the other cervical nodes suspicious for leyda metastasis  High risk of recurrence (lesion on head and neck).  Not felt to be excisional candidate due to locally advanced lesion.  Up-to-date algorithm for nonsurgical candidates recommends primary radiation therapy (preferred) or hedgehog pathway inhibitor (preferred) or immune checkpoint inhibitor (cemiplimab).  Clinical follow-up for  recurrence every 6-12 months.  --01/13/2023- PET scan:  1.  2.2 x 1.1 cm hypermetabolic soft tissue thickening in the RIGHT external auditory canal, correlating with biopsy-proven basal cell carcinoma. 2.  No evidence of hypermetabolic metastatic disease in the head, neck, chest, abdomen, pelvis, or bilateral lower extremities. 3.  Moderate size hiatal hernia  Definitive intent radiotherapy (7000 cGy in 35 daily fractions - 1/11/23- 3/6/23)  --8/4/23- PET scan- Decreasing size and intensity of FDG uptake of the right external auditory canal supporting response to current therapy. No evidence of regional lymphatic or distant metastasis.  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 Beth Richards, [facility] for chronic thrombocytopenia.  Question ITP vs MDS.  Last seen in office by Dr. Richards, 07/08/2017.  At that time platelets have been stable for the past few years without active bleeding.  Platelet count was 23,000,.  To a baseline of 30,000.  No aggressive treatments were pursued.  Previous bone marrow biopsy, 11/05/2012 showed hypercellular marrow showing maturing trilineage hematopoiesis with mild dyserythropoiesis and mild megakaryocytic atypia.  No increase in blasts.    --8/24/23- platelets 31,000 (ramon: 23,000 per records)  Mild normocytic anemia  --Stable.  Hgb 11.6, 8/24/23 (prior:  Hgb 11.9, 11/28/22)  Diabetes mellitus.  Advanced age  Plan:  Apprised of labs, 8/24/23- Hgb 11.6; MCV 91, WBC 5.7, platelets 31 (stable)  Add to labs:  Iron, fe sat, ferritin, B12, folate, anti-platelets antibodies and manual smear review by pathology  Apprised of the PET scan, 8/4/23 (above).  Decreasing size and intensity of FDG uptake of the right external auditory canal supporting response to current therapy. No evidence of regional lymphatic or distant metastasis.  Review progress note from PRATIBHA Dave with Dr. Taylor, 4/20/23 - A/P: Locally advanced high risk basal  "cell skin cancer of the right ear acoustic meatus obstructing the distal auditory canal. She completed 7000 cGy in 35 fractions to the right ear on 03/06/2023.  Without evidence for recurrent or metastatic disease on exam today. She will return in 3 months for follow up.   Erivedge (vismodegib).  Potential toxicities again discussed (to include but not limited to: Liver injury, azotemia, hyponatremia, hypokalemia, Andres-Gulshan syndrome, toxic epidermal necrolysis, muscle spasms, alopecia, dysgeusia, weight loss, fatigue, nausea, amenorrhea, diarrhea, decreased appetite, constipation, arthralgia, vomiting, loss of taste, elevated CK, azotemia.    Previously discussed that if PET scan does not show metastatic disease and if disease can be encompassed by definitive RT, given patient's advanced age and comorbidities, will consider withholding systemic therapy until she develops more widespread \"measurable disease\".  On the other hand, if PET scan shows metastatic disease, will consider systemic therapy up-front    Continue management per PCP and other specialists  Schedule whole body PET scan in 23 weeks  Return to office in 24 weeks with preoffice CMP, iron, fe sat, ferritin, b12, folate and CBC w diff.      09/19/2023 - Appointment with :  Follow up in 5 months     10/19/2023 - Appointment with :  PET scan completed 08/04/2023 revealed a decreasing size and intensity of FDG uptake of the right external auditory canal supporting response to current therapy. No evidence of regional lymphatic or distant metastasis.  I have reviewed the chart and other physicians records. she denies untoward side effects from treatment and without evidence for recurrent or metastatic disease on exam today. She will return in 2 months for follow up. Continue ongoing management per primary care physician and other specialists.  Plan:  Return in 2 months    01/04/2024 - Appointment with :  Follow up in 6 months "     02/01/2024 - PET Scan:  No evidence of metastatic disease.  Similar-appearing small focus of slightly increased/asymmetric metabolic activity within the anterior right external auditory canal, with activity below liver background level. This low-level activity may be posttreatment inflammatory change but residual active neoplasm is not excluded. Degree of uptake is similar to slightly decreased from the PET/CT performed in August 2023 and markedly decreased from the PET/CT performed January 2023.  10 mm calculus at the left ureterovesical junction causing severe left-sided hydronephrosis.    02/27/2024 - Appointment with :  Plan:  Apprised of labs, 2/27/24- glucose 168 otherwise normal CMP, stable anemia, stable thrombocytopenia (24,000), normal WBC  Review labs, 8/24/23:  Iron 77, fe sat 22%, ferritin 63 (L), B12 625, folate>20, anti-platelets antibodies -negative.  Manual smear review by pathology:  No schistocytes  Urology consult, 2/20/24 with KUB and Ct a/p (above)   Apprised of the PET scan, 2/1/24 (above).  No mets. Calculus left UVJ with severe left-sided hydronephrosis. Referred to urology  Review progress note from Dr. Taylor, 1/4/24 - A/P: Locally advanced high risk basal cell skin cancer of the right ear acoustic meatus obstructing the distal auditory canal. She completed 7000 cGy in 35 fractions to the right ear on 03/06/2023.  PET scan scheduled on 02/01/2024.  Without evidence for recurrent or metastatic disease on exam today. She will return in 6 months for follow up.   Erivedge (vismodegib).  Potential toxicities again discussed (to include but not limited to: Liver injury, azotemia, hyponatremia, hypokalemia, Andres-Gulshan syndrome, toxic epidermal necrolysis, muscle spasms, alopecia, dysgeusia, weight loss, fatigue, nausea, amenorrhea, diarrhea, decreased appetite, constipation, arthralgia, vomiting, loss of taste, elevated CK, azotemia.    Previously discussed that if PET scan  "does not show metastatic disease and if disease can be encompassed by definitive RT, given patient's advanced age and comorbidities, will consider withholding systemic therapy until she develops more widespread \"measurable disease\".  On the other hand, if PET scan shows metastatic disease, will consider systemic therapy up-front    Continue management per PCP and other specialists- Follow -up ENT (missed last 2/20/24) moved to ~ 3/6/24  Schedule whole body PET scan in 23 weeks  Return to office in 24 weeks with preoffice CMP, iron, fe sat, ferritin, b12, folate and CBC w diff.      05/06/2024 - Appointment with :  Call for episodes of pain or drainage   Will f/u after pet scan   Can use oil to left ear canal for cerumen impaction   Hearing test on f/u   Follow up in 3 months     08/06/2024 - PET Scan:  No evidence of hypermetabolic metastatic disease.  Persistent soft tissue thickening in the right external auditory canal and persistent right mastoid effusion. Interval decrease in low-level metabolic activity within this area of soft tissue thickening with max SUV now 2.2 (previously 2.7), which is below physiologic background levels.    History obtained from  PATIENT, FAMILY, and CHART    PAST MEDICAL HISTORY  Past Medical History:   Diagnosis Date    Basal cell carcinoma (BCC) of jamari of right ear     Idiopathic thrombocytopenic purpura (ITP)     Kidney stones       PAST SURGICAL HISTORY  Past Surgical History:   Procedure Laterality Date    APPENDECTOMY      CHOLECYSTECTOMY      SHOULDER SURGERY Left       FAMILY HISTORY  family history is not on file.    SOCIAL HISTORY  Social History     Tobacco Use    Smoking status: Never    Smokeless tobacco: Never   Vaping Use    Vaping status: Never Used   Substance Use Topics    Alcohol use: Never    Drug use: Never     ALLERGIES  Tramadol     MEDICATIONS    Current Outpatient Medications:     acetaminophen (TYLENOL) 500 MG tablet, Take 1 tablet by mouth Every 6 " "(Six) Hours As Needed for Mild Pain., Disp: , Rfl:     ascorbic acid (VITAMIN C) 1000 MG tablet, Take 1 tablet by mouth Daily., Disp: , Rfl:     CALCIUM PO, Take 1 tablet by mouth Daily., Disp: , Rfl:     CRANBERRY PO, Take 1 tablet by mouth Daily., Disp: , Rfl:     ibuprofen (ADVIL,MOTRIN) 200 MG tablet, Take 1 tablet by mouth Every 6 (Six) Hours As Needed for Mild Pain., Disp: , Rfl:     Multiple Vitamins-Minerals (EMERGEN-C IMMUNE PLUS PO), Take 1 tablet by mouth Daily., Disp: , Rfl:     ofloxacin (FLOXIN) 0.3 % otic solution, INSTILL 4 DROPS INTO EAR(S) 2 TIMES DAILY AS DIRECTED FOR 25 DAYS, Disp: 10 mL, Rfl: 0    potassium chloride (K-DUR,KLOR-CON) 20 MEQ CR tablet, Take 1 tablet by mouth 2 (Two) Times a Day., Disp: , Rfl:     Vitamin D, Cholecalciferol, (CHOLECALCIFEROL) 10 MCG (400 UNIT) tablet, Take 1 tablet by mouth Daily., Disp: , Rfl:     The following portions of the patient's history were reviewed and updated as appropriate: allergies, current medications, past family history, past medical history, past social history, past surgical history and problem list.    Current outpatient and discharge medications have been reconciled for the patient.  Reviewed by: PRATIBHA Dick    REVIEW OF SYSTEMS  Review of Systems   Constitutional:  Positive for fatigue (\"I'm drained today\"). Negative for activity change and unexpected weight change.   HENT:          Ear feels \"stopped up\"   Eyes:         Glasses   Respiratory: Negative.     Cardiovascular: Negative.    Gastrointestinal: Negative.    Endocrine: Negative.    Genitourinary: Negative.    Musculoskeletal: Negative.    Allergic/Immunologic: Negative.    Neurological:  Negative for dizziness and light-headedness.   Hematological: Negative.    Psychiatric/Behavioral: Negative.         PHYSICAL EXAM  VITAL SIGNS:   Vitals:    08/09/24 1014   BP: 116/50   Weight: 66.7 kg (147 lb)   Height: 167.6 cm (66\")   PainSc: 0-No pain       Physical Exam  Vitals " reviewed.   Constitutional:       Appearance: Normal appearance.   HENT:      Head: Normocephalic.      Right Ear: Tenderness present. No drainage.      Left Ear: External ear normal.      Ears:      Comments: Right ear status post radiation therapy; total canal stenosis, well-healed. No drainage.      Nose: Nose normal.   Eyes:      Pupils: Pupils are equal, round, and reactive to light.   Cardiovascular:      Rate and Rhythm: Normal rate and regular rhythm.      Pulses: Normal pulses.      Heart sounds: Normal heart sounds.   Pulmonary:      Effort: Pulmonary effort is normal. No respiratory distress.      Breath sounds: Normal breath sounds. No wheezing.   Abdominal:      General: Bowel sounds are normal.      Palpations: There is no mass.   Musculoskeletal:         General: Normal range of motion.      Cervical back: Normal range of motion and neck supple. No tenderness.   Lymphadenopathy:      Cervical: No cervical adenopathy.   Skin:     General: Skin is warm and dry.      Capillary Refill: Capillary refill takes less than 2 seconds.   Neurological:      General: No focal deficit present.      Mental Status: She is alert and oriented to person, place, and time.      Motor: No weakness.   Psychiatric:         Mood and Affect: Mood normal.         Behavior: Behavior normal.         Performance Status: ECOG (2) Ambulatory and capable of self care, unable to carry out work activity, up and about > 50% or waking hours    Clinical Quality Measures  -Pain Documented by Standardized Tool, FPS Rosanna Fergusonan Shankar reports a pain score of 0.  Given her pain assessment as noted, treatment options were discussed and the following options were decided upon as a follow-up plan to address the patient's pain:  no pain, no plan given .  Pain Medications               acetaminophen (TYLENOL) 500 MG tablet Take 1 tablet by mouth Every 6 (Six) Hours As Needed for Mild Pain.    ibuprofen (ADVIL,MOTRIN) 200 MG tablet Take 1  tablet by mouth Every 6 (Six) Hours As Needed for Mild Pain.          Body Mass Index Screening and Follow-Up Plan  Body mass index is 23.73 kg/m².  BMI is within normal parameters. No other follow-up for BMI required.    Tobacco Use: Screening and Cessation Intervention  Social History    Tobacco Use      Smoking status: Never      Smokeless tobacco: Never    Advanced Care Planning   Advance Care Planning  ACP discussion was held with the patient during this visit. Patient has an advance directive in EMR which is still valid.      ASSESSMENT AND PLAN  1. 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 History of radiation therapy    3. Non-smoker      No orders of the defined types were placed in this encounter.    RECOMMENDATIONS:    Rosanna Chaparro is status post completion of radiation therapy to the right ear and presents to our clinic today for routine follow up exam. 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 She completed 7000 cGy in 35 fractions to the right ear on 03/06/2023.     PET scan completed on 08/06/2024 revealed no evidence of hypermetabolic metastatic disease. Persistent soft tissue thickening in the right external auditory canal and persistent right mastoid effusion. Interval decrease in low-level metabolic activity within this area of soft tissue thickening with max SUV now 2.2 (previously 2.7), which is below physiologic background levels.    I have reviewed the chart and other physicians records. she denies untoward side effects from treatment and without evidence for recurrent or metastatic disease on exam today. She will follow up as needed. Continue ongoing management per primary care physician and other specialists.    Patient Instructions   1) return as needed    Return if symptoms worsen or fail to improve.    Time Spent: I spent 42 minutes caring for Rosanna on  this date of service. This time includes time spent by me in the following activities: preparing for the visit, reviewing tests, obtaining and/or reviewing a separately obtained history, performing a medically appropriate examination and/or evaluation, counseling and educating the patient/family/caregiver, ordering medications, tests, or procedures, referring and communicating with other health care professionals, documenting information in the medical record, independently interpreting results and communicating that information with the patient/family/caregiver, and care coordination.     Marshall Wood, APRN  08/09/2024

## 2024-08-08 ENCOUNTER — HOSPITAL ENCOUNTER (OUTPATIENT)
Dept: RADIATION ONCOLOGY | Facility: HOSPITAL | Age: 84
Setting detail: RADIATION/ONCOLOGY SERIES
End: 2024-08-08
Payer: MEDICARE

## 2024-08-09 ENCOUNTER — OFFICE VISIT (OUTPATIENT)
Age: 84
End: 2024-08-09
Payer: MEDICARE

## 2024-08-09 VITALS
DIASTOLIC BLOOD PRESSURE: 50 MMHG | SYSTOLIC BLOOD PRESSURE: 116 MMHG | BODY MASS INDEX: 23.63 KG/M2 | WEIGHT: 147 LBS | HEIGHT: 66 IN

## 2024-08-09 DIAGNOSIS — Z92.3 HISTORY OF RADIATION THERAPY: ICD-10-CM

## 2024-08-09 DIAGNOSIS — C44.211 BASAL CELL CARCINOMA (BCC) OF SKIN OF EAR, UNSPECIFIED LATERALITY: Primary | ICD-10-CM

## 2024-08-09 DIAGNOSIS — Z78.9 NON-SMOKER: ICD-10-CM

## 2024-08-09 PROCEDURE — G0463 HOSPITAL OUTPT CLINIC VISIT: HCPCS | Performed by: RADIOLOGY

## 2024-08-23 ENCOUNTER — PROCEDURE VISIT (OUTPATIENT)
Dept: OTOLARYNGOLOGY | Facility: CLINIC | Age: 84
End: 2024-08-23
Payer: MEDICARE

## 2024-08-23 ENCOUNTER — OFFICE VISIT (OUTPATIENT)
Dept: OTOLARYNGOLOGY | Facility: CLINIC | Age: 84
End: 2024-08-23
Payer: MEDICARE

## 2024-08-23 VITALS
HEART RATE: 82 BPM | BODY MASS INDEX: 23.3 KG/M2 | HEIGHT: 66 IN | DIASTOLIC BLOOD PRESSURE: 61 MMHG | SYSTOLIC BLOOD PRESSURE: 103 MMHG | WEIGHT: 145 LBS | TEMPERATURE: 98 F

## 2024-08-23 DIAGNOSIS — H90.A31 MIXED CONDUCTIVE AND SENSORINEURAL HEARING LOSS OF RIGHT EAR WITH RESTRICTED HEARING OF LEFT EAR: Primary | ICD-10-CM

## 2024-08-23 DIAGNOSIS — C44.212 BASAL CELL CARCINOMA (BCC) OF SKIN OF RIGHT EAR: ICD-10-CM

## 2024-08-23 DIAGNOSIS — H90.A22 SENSORINEURAL HEARING LOSS (SNHL) OF LEFT EAR WITH RESTRICTED HEARING OF RIGHT EAR: ICD-10-CM

## 2024-08-23 RX ORDER — NITROFURANTOIN 25; 75 MG/1; MG/1
100 CAPSULE ORAL 2 TIMES DAILY
COMMUNITY
Start: 2024-08-12

## 2024-08-23 NOTE — PROGRESS NOTES
AUDIOMETRIC EVALUATION      Name:  Rosanna Chaparro  :  1940  Age:  84 y.o.  Date of Evaluation:  2024       History:  Ms. Chaparro is seen today for a hearing evaluation due to cerumen impaction at the request of Leila Blanton, LISBETH-APRN.    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  Family history of hearing loss: No  Head trauma requiring hospital stay: No  Chemotherapy: No- but radiation on right ear for basal cell carcinoma  Other significant history: No    **Case history obtained in office and through EMR system      EVALUATION:        RESULTS:    Otoscopic Evaluation:  Right:  Atresia of ear canal  Left: clear canal, tympanic membrane visualized    Tympanometry (226 Hz):  Right:  CNT  Left: Type As    Pure Tone Audiometry:    Right: Severe sloping to profound mixed hearing loss  Left: Normal (250Hz-500Hz) sloping to severe (6000Hz-8000Hz) sensorineural hearing loss     Speech Audiometry:   Right: Speech Reception Threshold (SRT) was obtained at 90 dB HL  Word Recognition scores - Very Poor (20)% at 110 dB with 70 dB of contralateral masking, using NU-6 List 2A with 25 words  Left: Speech Reception Threshold (SRT) was obtained at 30 dB HL  Word Recognition scores - Excellent (100)% at 70 dB, using NU-6 List 1A with 10 words  SRT/PTA in good agreement bilaterally.    IMPRESSIONS:  Tympanometry showed normal middle ear pressure with reduced static compliance, consistent with hypomobile tympanic membrane, for the left ear. Pure tone thresholds for the right ear show mixed hearing loss, suggesting abnormal outer/middle ear function and abnormal cochlear/retrocochlear function. Pure tone thresholds for the left ear show sensorineural hearing loss, suggesting  normal outer/middle ear function and abnormal cochlear/retrocochlear function. Patient was counseled with regard to the findings.    Amplification needs: Patient could benefit from hearing aids.     Diagnosis:  1. Mixed conductive and sensorineural hearing loss of right ear with restricted hearing of left ear    2. Sensorineural hearing loss (SNHL) of left ear with restricted hearing of right ear         RECOMMENDATIONS/PLAN:  Follow-up recommendations per Leila Blanton, DNP-APRN.  Practice good communication strategies to assist with everyday listening (eye contact with speakers, reduce background noise, encourage others to communicate clearly and slowly).  Consistent utilization of hearing protection devices in noisy environments.  Repeat hearing evaluation if changes in hearing are noted or concerns arise.  Discussed results and recommendations with patient. Questions were addressed and the patient was encouraged to contact our department should concerns arise.        Heather Ortez, CCC-A, F-AAA  Doctor of Audiology

## 2024-08-23 NOTE — PROGRESS NOTES
YOB: 1940  Location: [facility]  Location Address: [address] [place] [address], [facility], [address] [place] [address]  Location Phone: [phone]    Chief Complaint   Patient presents with    Ear Problem       History of Present Illness  Rosanna Chaparro is a 84 y.o. female.  Rosanna Chaparro is here for follow up of ENT complaints. The patient status post basal cell carcinoma excision of the right ear 2022.  CT temporal bones revealed opacification of the right external canal patient has completed radiation treatment.  Patient complains of decreased hearing that has been worse since radiation.  She is followed by Dr. Taylor and Dr. Buchanan     Procedure visit with Heather Best AUD (2024)   NM PET/CT Whole Body (2024 10:26)      Past Medical History:   Diagnosis Date    Basal cell carcinoma (BCC) of jamari of right ear     Idiopathic thrombocytopenic purpura (ITP)     Kidney stones        Past Surgical History:   Procedure Laterality Date    APPENDECTOMY      CHOLECYSTECTOMY      SHOULDER SURGERY Left        Outpatient Medications Marked as Taking for the 24 encounter (Office Visit) with Leila Blanton APRN   Medication Sig Dispense Refill    acetaminophen (TYLENOL) 500 MG tablet Take 1 tablet by mouth Every 6 (Six) Hours As Needed for Mild Pain.      ascorbic acid (VITAMIN C) 1000 MG tablet Take 1 tablet by mouth Daily.      CALCIUM PO Take 1 tablet by mouth Daily.      ciprofloxacin (CIPRO) 250 MG tablet Take 1 tablet by mouth twice a day for 7 days for UTI 14 tablet 0    CRANBERRY PO Take 1 tablet by mouth Daily.      ibuprofen (ADVIL,MOTRIN) 200 MG tablet Take 1 tablet by mouth Every 6 (Six) Hours As Needed for Mild Pain.      Multiple Vitamins-Minerals (EMERGEN-C IMMUNE PLUS PO) Take 1 tablet by mouth Daily.      nitrofurantoin, macrocrystal-monohydrate, (MACROBID) 100 MG capsule Take 1 capsule by mouth 2 (Two) Times a Day.      ofloxacin (FLOXIN) 0.3 % otic  solution INSTILL 4 DROPS INTO EAR(S) 2 TIMES DAILY AS DIRECTED FOR 25 DAYS 10 mL 0    potassium chloride (K-DUR,KLOR-CON) 20 MEQ CR tablet Take 1 tablet by mouth 2 (Two) Times a Day.      Vitamin D, Cholecalciferol, (CHOLECALCIFEROL) 10 MCG (400 UNIT) tablet Take 1 tablet by mouth Daily.         Tramadol    History reviewed. No pertinent family history.    Social History     Socioeconomic History    Marital status:    Tobacco Use    Smoking status: Never    Smokeless tobacco: Never   Vaping Use    Vaping status: Never Used   Substance and Sexual Activity    Alcohol use: Never    Drug use: Never       Review of Systems   HENT:  Positive for hearing loss. Negative for ear pain.        Vitals:    08/23/24 0955   BP: 103/61   Pulse: 82   Temp: 98 °F (36.7 °C)       Body mass index is 23.4 kg/m².    Objective     Physical Exam  Vitals reviewed.   Constitutional:       Appearance: She is ill-appearing.   HENT:      Head: Normocephalic.      Left Ear: Tympanic membrane, ear canal and external ear normal.      Ears:      Comments: Right with significant canal stenosis and mild inflammation   Crusted debris noted to external canal        Nose: Nose normal.      Mouth/Throat:      Lips: Pink.         Assessment & Plan   Diagnoses and all orders for this visit:    1. Mixed conductive and sensorineural hearing loss of right ear with restricted hearing of left ear (Primary)    2. Basal cell carcinoma (BCC) of skin of right ear      * Surgery not found *  No orders of the defined types were placed in this encounter.    Return in about 3 months (around 11/23/2024).     Continue follow-up with Chanel on  Discussed hearing aid amplification for left ear  Discussed possibility of Baha for right ear patient does not wish to pursue at this time    There are no Patient Instructions on file for this visit.

## 2024-10-14 ENCOUNTER — LAB (OUTPATIENT)
Dept: LAB | Facility: HOSPITAL | Age: 84
End: 2024-10-14
Payer: MEDICARE

## 2024-10-14 ENCOUNTER — OFFICE VISIT (OUTPATIENT)
Dept: ONCOLOGY | Facility: CLINIC | Age: 84
End: 2024-10-14
Payer: MEDICARE

## 2024-10-14 ENCOUNTER — TELEPHONE (OUTPATIENT)
Dept: ONCOLOGY | Facility: CLINIC | Age: 84
End: 2024-10-14

## 2024-10-14 VITALS
DIASTOLIC BLOOD PRESSURE: 68 MMHG | HEART RATE: 90 BPM | WEIGHT: 146.3 LBS | OXYGEN SATURATION: 98 % | TEMPERATURE: 97.6 F | SYSTOLIC BLOOD PRESSURE: 118 MMHG | HEIGHT: 66 IN | BODY MASS INDEX: 23.51 KG/M2 | RESPIRATION RATE: 18 BRPM

## 2024-10-14 DIAGNOSIS — C44.212 BASAL CELL CARCINOMA (BCC) OF SKIN OF RIGHT EAR: Primary | ICD-10-CM

## 2024-10-14 DIAGNOSIS — C44.212 BASAL CELL CARCINOMA (BCC) OF SKIN OF RIGHT EAR: ICD-10-CM

## 2024-10-14 LAB
ALBUMIN SERPL-MCNC: 3.8 G/DL (ref 3.5–5.2)
ALBUMIN/GLOB SERPL: 1.3 G/DL
ALP SERPL-CCNC: 58 U/L (ref 39–117)
ALT SERPL W P-5'-P-CCNC: 10 U/L (ref 1–33)
ANION GAP SERPL CALCULATED.3IONS-SCNC: 12 MMOL/L (ref 5–15)
AST SERPL-CCNC: 17 U/L (ref 1–32)
BASOPHILS # BLD AUTO: 0.05 10*3/MM3 (ref 0–0.2)
BASOPHILS NFR BLD AUTO: 0.8 % (ref 0–1.5)
BILIRUB SERPL-MCNC: 0.3 MG/DL (ref 0–1.2)
BUN SERPL-MCNC: 28 MG/DL (ref 8–23)
BUN/CREAT SERPL: 30.1 (ref 7–25)
CALCIUM SPEC-SCNC: 9.2 MG/DL (ref 8.6–10.5)
CHLORIDE SERPL-SCNC: 105 MMOL/L (ref 98–107)
CO2 SERPL-SCNC: 24 MMOL/L (ref 22–29)
CREAT SERPL-MCNC: 0.93 MG/DL (ref 0.57–1)
DEPRECATED RDW RBC AUTO: 45.7 FL (ref 37–54)
EGFRCR SERPLBLD CKD-EPI 2021: 60.7 ML/MIN/1.73
EOSINOPHIL # BLD AUTO: 0.22 10*3/MM3 (ref 0–0.4)
EOSINOPHIL NFR BLD AUTO: 3.4 % (ref 0.3–6.2)
ERYTHROCYTE [DISTWIDTH] IN BLOOD BY AUTOMATED COUNT: 14.2 % (ref 12.3–15.4)
FERRITIN SERPL-MCNC: 81.19 NG/ML (ref 13–150)
FOLATE SERPL-MCNC: >20 NG/ML (ref 4.78–24.2)
GLOBULIN UR ELPH-MCNC: 3 GM/DL
GLUCOSE SERPL-MCNC: 115 MG/DL (ref 65–99)
HCT VFR BLD AUTO: 36.5 % (ref 34–46.6)
HGB BLD-MCNC: 11.4 G/DL (ref 12–15.9)
IMM GRANULOCYTES # BLD AUTO: 0.02 10*3/MM3 (ref 0–0.05)
IMM GRANULOCYTES NFR BLD AUTO: 0.3 % (ref 0–0.5)
IRON 24H UR-MRATE: 62 MCG/DL (ref 37–145)
IRON SATN MFR SERPL: 20 % (ref 20–50)
LYMPHOCYTES # BLD AUTO: 1.8 10*3/MM3 (ref 0.7–3.1)
LYMPHOCYTES NFR BLD AUTO: 27.6 % (ref 19.6–45.3)
MCH RBC QN AUTO: 27.5 PG (ref 26.6–33)
MCHC RBC AUTO-ENTMCNC: 31.2 G/DL (ref 31.5–35.7)
MCV RBC AUTO: 88.2 FL (ref 79–97)
MONOCYTES # BLD AUTO: 0.39 10*3/MM3 (ref 0.1–0.9)
MONOCYTES NFR BLD AUTO: 6 % (ref 5–12)
NEUTROPHILS NFR BLD AUTO: 4.05 10*3/MM3 (ref 1.7–7)
NEUTROPHILS NFR BLD AUTO: 61.9 % (ref 42.7–76)
PLATELET # BLD AUTO: 43 10*3/MM3 (ref 140–450)
PMV BLD AUTO: 10.4 FL (ref 6–12)
POTASSIUM SERPL-SCNC: 4.1 MMOL/L (ref 3.5–5.2)
PROT SERPL-MCNC: 6.8 G/DL (ref 6–8.5)
RBC # BLD AUTO: 4.14 10*6/MM3 (ref 3.77–5.28)
SODIUM SERPL-SCNC: 141 MMOL/L (ref 136–145)
TIBC SERPL-MCNC: 317 MCG/DL (ref 298–536)
TRANSFERRIN SERPL-MCNC: 213 MG/DL (ref 200–360)
VIT B12 BLD-MCNC: >2000 PG/ML (ref 211–946)
WBC NRBC COR # BLD AUTO: 6.53 10*3/MM3 (ref 3.4–10.8)

## 2024-10-14 PROCEDURE — 1126F AMNT PAIN NOTED NONE PRSNT: CPT | Performed by: INTERNAL MEDICINE

## 2024-10-14 PROCEDURE — 36415 COLL VENOUS BLD VENIPUNCTURE: CPT

## 2024-10-14 PROCEDURE — 1159F MED LIST DOCD IN RCRD: CPT | Performed by: INTERNAL MEDICINE

## 2024-10-14 PROCEDURE — 85025 COMPLETE CBC W/AUTO DIFF WBC: CPT

## 2024-10-14 PROCEDURE — 82746 ASSAY OF FOLIC ACID SERUM: CPT

## 2024-10-14 PROCEDURE — 84466 ASSAY OF TRANSFERRIN: CPT

## 2024-10-14 PROCEDURE — 82607 VITAMIN B-12: CPT

## 2024-10-14 PROCEDURE — 80053 COMPREHEN METABOLIC PANEL: CPT

## 2024-10-14 PROCEDURE — 1160F RVW MEDS BY RX/DR IN RCRD: CPT | Performed by: INTERNAL MEDICINE

## 2024-10-14 PROCEDURE — 82728 ASSAY OF FERRITIN: CPT

## 2024-10-14 PROCEDURE — 83540 ASSAY OF IRON: CPT

## 2024-10-14 PROCEDURE — 99214 OFFICE O/P EST MOD 30 MIN: CPT | Performed by: INTERNAL MEDICINE

## 2024-10-14 NOTE — TELEPHONE ENCOUNTER
Critical platelet count called in from main lab. Reported to Dr. ERVIN at 10:19am  on 10/14/24. Per Dr. ERVIN pt has ITP. Stable

## 2025-03-20 ENCOUNTER — HOSPITAL ENCOUNTER (OUTPATIENT)
Dept: CT IMAGING | Facility: HOSPITAL | Age: 85
Discharge: HOME | End: 2025-03-20
Payer: MEDICARE

## 2025-03-20 DIAGNOSIS — C44.212 BASAL CELL CARCINOMA (BCC) OF SKIN OF RIGHT EAR: ICD-10-CM

## 2025-03-20 PROCEDURE — 78816 PET IMAGE W/CT FULL BODY: CPT

## 2025-03-20 PROCEDURE — 34310000005 FLUDEOXYGLUCOSE F18 SOLUTION: Performed by: INTERNAL MEDICINE

## 2025-03-20 PROCEDURE — A9552 F18 FDG: HCPCS | Performed by: INTERNAL MEDICINE

## 2025-03-20 RX ADMIN — FLUDEOXYGLUCOSE F18 1 DOSE: 300 INJECTION INTRAVENOUS at 09:49

## 2025-04-09 ENCOUNTER — LAB (OUTPATIENT)
Dept: LAB | Facility: HOSPITAL | Age: 85
End: 2025-04-09
Payer: MEDICARE

## 2025-04-09 DIAGNOSIS — C44.212 BASAL CELL CARCINOMA (BCC) OF SKIN OF RIGHT EAR: ICD-10-CM

## 2025-04-09 LAB
ALBUMIN SERPL-MCNC: 3.9 G/DL (ref 3.5–5.2)
ALBUMIN/GLOB SERPL: 1.4 G/DL
ALP SERPL-CCNC: 53 U/L (ref 39–117)
ALT SERPL W P-5'-P-CCNC: 10 U/L (ref 1–33)
ANION GAP SERPL CALCULATED.3IONS-SCNC: 10 MMOL/L (ref 5–15)
AST SERPL-CCNC: 15 U/L (ref 1–32)
BASOPHILS # BLD AUTO: 0.04 10*3/MM3 (ref 0–0.2)
BASOPHILS NFR BLD AUTO: 0.5 % (ref 0–1.5)
BILIRUB SERPL-MCNC: 0.3 MG/DL (ref 0–1.2)
BUN SERPL-MCNC: 29 MG/DL (ref 8–23)
BUN/CREAT SERPL: 25.2 (ref 7–25)
CALCIUM SPEC-SCNC: 9.2 MG/DL (ref 8.6–10.5)
CHLORIDE SERPL-SCNC: 104 MMOL/L (ref 98–107)
CO2 SERPL-SCNC: 26 MMOL/L (ref 22–29)
CREAT SERPL-MCNC: 1.15 MG/DL (ref 0.57–1)
DEPRECATED RDW RBC AUTO: 44.5 FL (ref 37–54)
EGFRCR SERPLBLD CKD-EPI 2021: 46.8 ML/MIN/1.73
EOSINOPHIL # BLD AUTO: 0.2 10*3/MM3 (ref 0–0.4)
EOSINOPHIL NFR BLD AUTO: 2.7 % (ref 0.3–6.2)
ERYTHROCYTE [DISTWIDTH] IN BLOOD BY AUTOMATED COUNT: 13.2 % (ref 12.3–15.4)
FERRITIN SERPL-MCNC: 100.6 NG/ML (ref 13–150)
FOLATE SERPL-MCNC: >20 NG/ML (ref 4.78–24.2)
GLOBULIN UR ELPH-MCNC: 2.8 GM/DL
GLUCOSE SERPL-MCNC: 126 MG/DL (ref 65–99)
HCT VFR BLD AUTO: 35 % (ref 34–46.6)
HGB BLD-MCNC: 11 G/DL (ref 12–15.9)
IMM GRANULOCYTES # BLD AUTO: 0.02 10*3/MM3 (ref 0–0.05)
IMM GRANULOCYTES NFR BLD AUTO: 0.3 % (ref 0–0.5)
IRON 24H UR-MRATE: 53 MCG/DL (ref 37–145)
IRON SATN MFR SERPL: 18 % (ref 20–50)
LYMPHOCYTES # BLD AUTO: 1.91 10*3/MM3 (ref 0.7–3.1)
LYMPHOCYTES NFR BLD AUTO: 26.1 % (ref 19.6–45.3)
MCH RBC QN AUTO: 29 PG (ref 26.6–33)
MCHC RBC AUTO-ENTMCNC: 31.4 G/DL (ref 31.5–35.7)
MCV RBC AUTO: 92.3 FL (ref 79–97)
MONOCYTES # BLD AUTO: 0.45 10*3/MM3 (ref 0.1–0.9)
MONOCYTES NFR BLD AUTO: 6.2 % (ref 5–12)
NEUTROPHILS NFR BLD AUTO: 4.69 10*3/MM3 (ref 1.7–7)
NEUTROPHILS NFR BLD AUTO: 64.2 % (ref 42.7–76)
PLATELET # BLD AUTO: 41 10*3/MM3 (ref 140–450)
PMV BLD AUTO: 11.5 FL (ref 6–12)
POTASSIUM SERPL-SCNC: 4.4 MMOL/L (ref 3.5–5.2)
PROT SERPL-MCNC: 6.7 G/DL (ref 6–8.5)
RBC # BLD AUTO: 3.79 10*6/MM3 (ref 3.77–5.28)
SODIUM SERPL-SCNC: 140 MMOL/L (ref 136–145)
TIBC SERPL-MCNC: 291 MCG/DL (ref 298–536)
TRANSFERRIN SERPL-MCNC: 195 MG/DL (ref 200–360)
VIT B12 BLD-MCNC: 597 PG/ML (ref 211–946)
WBC NRBC COR # BLD AUTO: 7.31 10*3/MM3 (ref 3.4–10.8)

## 2025-04-09 PROCEDURE — 80053 COMPREHEN METABOLIC PANEL: CPT

## 2025-04-09 PROCEDURE — 82746 ASSAY OF FOLIC ACID SERUM: CPT

## 2025-04-09 PROCEDURE — 84466 ASSAY OF TRANSFERRIN: CPT

## 2025-04-09 PROCEDURE — 85025 COMPLETE CBC W/AUTO DIFF WBC: CPT

## 2025-04-09 PROCEDURE — 83540 ASSAY OF IRON: CPT

## 2025-04-09 PROCEDURE — 82607 VITAMIN B-12: CPT

## 2025-04-09 PROCEDURE — 36415 COLL VENOUS BLD VENIPUNCTURE: CPT

## 2025-04-09 PROCEDURE — 82728 ASSAY OF FERRITIN: CPT

## 2025-04-17 NOTE — PROGRESS NOTES
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  She has received definitive intent radiotherapy (7000 cGy in 35 daily fractions - 1/11/23-3/6/23). .  She is accompanied by her  and granddaughter Keturah (previously with daughter, Hailey)      I have reviewed the HPI and verified with the patient the accuracy of it. No changes to interval history since the information was documented. Reynaldo Mock MD 04/24/25      Diagnostic abnormalities:  --History includes diabetes, and was previously followed by Dr. Beth Richards, [facility] for chronic thrombocytopenia.  Question ITP vs MDS.  Last seen in office by Dr. Richards, 07/08/2017.  At that time platelets have been stable for the past few years without active bleeding.  Platelet count was 23,000,.  To a baseline of 30,000.  No aggressive treatments were pursued.  Previous bone marrow biopsy, 11/05/2012 showed hypercellular marrow showing maturing trilineage hematopoiesis with mild dyserythropoiesis and mild megakaryocytic atypia.  No increase in blasts.  Plan: If platelet continues to worsen drops to 10,000-20,000 or evidence of active bleeding then platelet transfusion can be tried and if this does not work then immunosuppressant such as prednisone will be next treatment option.  --07/29/2022-seen by PCP, Dr. Marvin Vasques.  Patient referred to ENT after right ear exam showed stenotic ear canal with a vestibule showing scaly plaques/mass/neoplasm?  --09/01/2022- seen for ENT by Dr. Cao.  Right ear scabby and draining puslike substance  for the past 1 year.  Patient stated that she had a lesion to her right ear for the past year that has progressively gotten larger, and had blistered few days prior before starting to drain.  --09/23/2022- CT temporal bones-1. Abnormal skin thickening and enhancement at the right acoustic meatus. Recommend clinical correlation/visual inspection in this area. The right external canal is near completely opacified with soft tissue debris. I do not see destructive bony change along the external canal. 2. Middle ear cavities and mastoids are clear. Ossicular chains are intact.3. Nearly dehiscent superior semicircular canals, bilateral.  -- 11/03/2022- 3 mm punch biopsy lesion from jamari right ear.  Microscopic diagnosis: Basal cell carcinoma-surrounded by scar tissue (shave specimen and superficial skin)  --11/28/2022 -CMP normal.  B12 1194, folate>20, ferritin 65, iron, iron saturation 20%, hemoglobin 11.9, hematocrit 38.8, MCV 90.9, WBC 8.84, platelets 48,000  -- 12/05/2022 - CT head- 1. Age-appropriate atrophy. 2. Otherwise normal CT of the brain with and without contrast.  -- 12/05/2022- CT soft tissue neck-  1. 2.0 x 1.8 x 1.2 cm enhancing skin lesion involving the jamari of the right ear. Up to 1.1 cm depth of invasion with the deep margin essentially abutting the bony elements of the external auditory canal along the roof and floor of the external canal. There is also a 7 mm depth of invasion inferiorly, with the lesion abutting the capsule of the superficial lobe of the right parotid gland. There is a 7 mm hyperenhancing lymph node in the upper level 2B neck. This is not technically enlarged by size criteria but this does appear to be hyperenhancing relative to the other cervical nodes and I'm suspicious that this is a leyda metastasis.  -- 12/05/2022- CT chest- 1. Rather benign-appearing subpleural pulmonary nodule in the right lower lobe which could be followed up on subsequent imaging. There is mild basilar  atelectasis as well as linear scarring or atelectasis in the left upper lobe. No evidence of consolidative pneumonia. 2. Heavy coronary calcifications are present. There is mild cardiomegaly. The thoracic aorta and great vessels are ectatic. 3. No convincing evidence of metastatic disease to the thorax. 4. Hiatal hernia. There is a cortical cyst of the upper pole of the left kidney.  -- 12/05/2022 - CT abdomen/pelvis- 1. No radiographic evidence of metastatic disease to the abdomen or pelvis. 2. Multiple compression deformities in the thoracic and lumbar spine. The fracture at L3 is more acute to subacute in appearance and should be correlated clinically. There is an accentuated thoracic kyphosis with mild gibbus deformity with the thoracolumbar juncture. 3. Cortical cyst upper pole left kidney. No evidence of nephrolithiasis or obstructive uropathy. 4. Moderate hiatal hernia. 5. Heavy atheromatous calcification of the abdominal aorta and iliac arteries with no evidence of aneurysm.  --12/09/2022- Consult-Dr. Rhodes of radiation oncology- RECOMMENDATIONS: Rosanna Chaparro has been referred to our office today to discuss radiotherapy recommendations for locally advanced high risk basal cell skin cancer of the right ear acoustic meatus obstructing the distal auditory canal.  Tumor burden includes a 2 cm tumor present at the acoustic meatus causing decreased unilatera hearing with up to 1.1 cm depth of invasion with the deep margin essentially abutting the bony elements of the external auditory canal along the roof and floor of the external canaliorly, with the lesion abutting the capsule of the superficial lobe of the right parotid gland. There is a 7 mm hyperenhancing lymph node in the upper level 2B neck.  Per radiology this is not technically enlarged by size criteria but this does appear to be hyperenhancing relative to the other cervical nodes and I'm suspicious that this is a leyda metastasis, thus T1N0 MX  versus T1N1MX presentation.     Patient is considering treatment options that have been offered to her including surgical resection and systemic therapy with vismodegib.  She presents today for consultation regarding radiotherapy options.     We have discussed the indications and rationale of radiation therapy according to the NCCN Guidelines.  Given a prominent ipsilateral neck lymph node that measures 7 mm with hyperintensity on CT scan, I discussed possibility of obtaining a biopsy or proceeding with PET scan to rule out regional leyda spread.  I do believe definitive intent radiotherapy will be a good option for her but she is also considering surgical and systemic therapy options.  I have extensively reviewed the risks, benefits and alternatives of therapy and progression of disease in spite of therapy with either local or systemic failure.   I have seen, examined and reviewed her medication list, appropriate labs and imaging studies as well as other physician notes. We discussed the goals/plans of care and answered all questions.      In summary, 3 options were presented to her which included needle biopsy of a prominent ipsilateral neck lymph node, which of positive may lend itself to additional treatment recommendations, versus PET scan staging, versus proceeding with definitive intent radiotherapy to include primary lesion and suspicious ipsilateral lymphatic region.  I explained the intent, benefits, course, precautions, and side effects of treatment which include worsening skin reactions including erythema and skin desquamation, radiation-induced hearing changes, radiation-induced salivary gland dysfunction which can lead to xerostomia, patchy alopecia, fatigue and possibly ipsilateral oropharyngeal mucositis.  Their questions were answered to their satisfaction and at the end of our discussion patient and her granddaughter wished to discuss the totality of treatment options presented to them with her  "family and call back with a decision.  If patient wishes to proceed with definitive intent radiotherapy, anticipate treating to a dose of 7000 cGy to be given over 35 daily fractions (Monday through Friday).  We will await patient call back with a decision.    --01/13/2023- PET scan:  1.  2.2 x 1.1 cm hypermetabolic soft tissue thickening in the RIGHT external auditory canal, correlating with biopsy-proven basal cell carcinoma. 2.  No evidence of hypermetabolic metastatic disease in the head, neck, chest, abdomen, pelvis, or bilateral lower extremities. 3.  Moderate size hiatal hernia    Previous interventions:  -- Definitive intent radiotherapy, 7000 cGy over 35 daily fractions (Monday- Friday).1/11/2023- 3/6/23    PAST MEDICAL HISTORY:  ALLERGIES:  Allergies   Allergen Reactions    Tramadol Mental Status Change     Made her \"crazy\"     CURRENT MEDICATIONS:  Outpatient Encounter Medications as of 4/24/2025   Medication Sig Dispense Refill    acetaminophen (TYLENOL) 500 MG tablet Take 1 tablet by mouth Every 6 (Six) Hours As Needed for Mild Pain.      ascorbic acid (VITAMIN C) 1000 MG tablet Take 1 tablet by mouth Daily.      CALCIUM PO Take 1 tablet by mouth Daily.      CRANBERRY PO Take 1 tablet by mouth Daily.      ibuprofen (ADVIL,MOTRIN) 200 MG tablet Take 1 tablet by mouth Every 6 (Six) Hours As Needed for Mild Pain.      Multiple Vitamins-Minerals (EMERGEN-C IMMUNE PLUS PO) Take 1 tablet by mouth Daily.      potassium chloride (K-DUR,KLOR-CON) 20 MEQ CR tablet Take 1 tablet by mouth 2 (Two) Times a Day.      Probiotic Product (CULTURELLE PROBIOTICS PO) Take  by mouth.      Vitamin D, Cholecalciferol, (CHOLECALCIFEROL) 10 MCG (400 UNIT) tablet Take 1 tablet by mouth Daily.       No facility-administered encounter medications on file as of 4/24/2025.     Adult illnesses:  Diabetes mellitus  Chronic thrombocytopenia (ITP+/- MDS)  Former smoker  Basal cell carcinoma right ear    Past surgeries:  Laparoscopic " "inguinal hernia repair  Appendectomy  Cholecystectomy, 2000  Renal lithotripsy x3 for renal stones, most recently 03/28/2018  Orthopedic surgery: Left shoulder fracture repair, 2018 with blood transfusion  Biopsy right ear jamari, 11/3/2022    ADULT ILLNESSES:  Patient Active Problem List   Diagnosis Code    Neoplasm of uncertain behavior of ear D48.5    Basal cell carcinoma C44.91    Non-smoker Z78.9    Thrombocytopenia D69.6    History of radiation therapy Z92.3    Basal cell carcinoma (BCC) of skin of ear C44.211     SURGERIES:  Past Surgical History:   Procedure Laterality Date    APPENDECTOMY      CHOLECYSTECTOMY      SHOULDER SURGERY Left      HEALTH MAINTENANCE ITEMS:  Health Maintenance Due   Topic Date Due    DXA SCAN  Never done    Pneumococcal Vaccine 50+ (1 of 2 - PCV) Never done    TDAP/TD VACCINES (1 - Tdap) Never done    RSV Vaccine - Adults (1 - 1-dose 75+ series) Never done    COVID-19 Vaccine (3 - Moderna risk series) 05/03/2021    ANNUAL WELLNESS VISIT  Never done    ZOSTER VACCINE (2 of 2) 10/30/2024       <no information>  Last Completed Colonoscopy    This patient has no relevant Health Maintenance data.         There is no immunization history on file for this patient.  Last Completed Mammogram    This patient has no relevant Health Maintenance data.           FAMILY HISTORY:  No family history on file.  SOCIAL HISTORY:  Social History     Socioeconomic History    Marital status:    Tobacco Use    Smoking status: Never    Smokeless tobacco: Never   Vaping Use    Vaping status: Never Used   Substance and Sexual Activity    Alcohol use: Never    Drug use: Never       REVIEW OF SYSTEMS:  Review of Systems   Constitutional: Negative.  Negative for fatigue.        Manages her personal ADLs, light house chores, but does not run errands on her own and has not been driving. \"My  just won't let me.\" Is up and about \"most all the time.\" Has a rolling walker at home that she only " "occasionally uses.  Again states, \"I just have it for balance.\"   HENT:  Positive for hearing loss (Previous lesion in the right ear appears to have healed, \"after it scabbed over\".).    Eyes: Negative.    Respiratory: Negative.     Cardiovascular: Negative.    Gastrointestinal: Negative.    Endocrine: Negative.    Genitourinary: Negative.    Musculoskeletal:  Negative for arthralgias.   Skin:  Negative for skin lesions (right ear canal lesion appears to have healed).   Allergic/Immunologic: Negative.    Neurological: Negative.    Hematological: Negative.    Psychiatric/Behavioral: Negative.           BP 96/62   Pulse 81   Temp 97.3 °F (36.3 °C) (Temporal)   Resp 18   Ht 167.6 cm (66\")   Wt 65.3 kg (144 lb)   LMP  (LMP Unknown)   SpO2 99%   BMI 23.24 kg/m²  Body surface area is 1.74 meters squared.  Pain Score    04/24/25 1021   PainSc: 0-No pain           Physical Exam  Vitals and nursing note reviewed.   Constitutional:       Comments: Pleasant, cooperative, modestly kept, heavy-set elderly female.  Brought in by wheelchair but is able to stand and ambulate with a cane.    Has lost 2 lb (in addition to 18 lb at her 4 prior visits) since the last visit   HENT:      Head: Normocephalic and atraumatic.      Ears:      Comments: Again note stenotic ear canal with acoustic meatus again notable for resolution of pigmentation anteriorly and posteriorly since receipt of RT.  RT associated skin hyperemia surrounding the lesion appears to have resolved  Eyes:      General: No scleral icterus.     Extraocular Movements: Extraocular movements intact.      Conjunctiva/sclera: Conjunctivae normal.      Pupils: Pupils are equal, round, and reactive to light.   Cardiovascular:      Rate and Rhythm: Normal rate.   Pulmonary:      Effort: Pulmonary effort is normal.   Abdominal:      Palpations: Abdomen is soft.      Tenderness: There is no abdominal tenderness.   Musculoskeletal:         General: Normal range of motion.    "   Cervical back: Normal range of motion.   Lymphadenopathy:      Head:      Right side of head: No submental, submandibular, preauricular, posterior auricular or occipital adenopathy.      Left side of head: No submental, submandibular, preauricular, posterior auricular or occipital adenopathy.      Cervical: No cervical adenopathy.      Right cervical: No superficial, deep or posterior cervical adenopathy.     Left cervical: No superficial, deep or posterior cervical adenopathy.      Upper Body:      Right upper body: No supraclavicular or axillary adenopathy.      Left upper body: No supraclavicular or axillary adenopathy.   Neurological:      General: No focal deficit present.      Mental Status: She is alert and oriented to person, place, and time.   Psychiatric:         Mood and Affect: Mood normal.         Thought Content: Thought content normal.         Assessment:  1.   Basal cell carcinoma of the right ear acoustic meatus  -- Tumor stage: cT1, cN0   -- Tumor burden/complications:  2 cm tumor present at the acoustic meatus causing decreased unilateral hearing with up to 1.1 cm depth of invasion with the deep margin essentially abutting the bony elements of the external auditory canal along the roof and floor of the external canaliorly, with the lesion abutting the capsule of the superficial lobe of the right parotid gland. There is a 7 mm hyperenhancing lymph node in the upper level 2B neck.  Per radiology this is not technically enlarged by size criteria but this does appear to be hyperenhancing relative to the other cervical nodes suspicious for leyda metastasis  -- High risk of recurrence (lesion on head and neck).  Not felt to be excisional candidate due to locally advanced lesion.  -- Up-to-date algorithm for nonsurgical candidates recommends primary radiation therapy (preferred) or hedgehog pathway inhibitor (preferred) or immune checkpoint inhibitor (cemiplimab).  Clinical follow-up for recurrence  every 6-12 months.  --01/13/2023- PET scan:  1.  2.2 x 1.1 cm hypermetabolic soft tissue thickening in the RIGHT external auditory canal, correlating with biopsy-proven basal cell carcinoma. 2.  No evidence of hypermetabolic metastatic disease in the head, neck, chest, abdomen, pelvis, or bilateral lower extremities. 3.  Moderate size hiatal hernia  -- Definitive intent radiotherapy (7000 cGy in 35 daily fractions - 1/11/23- 3/6/23)  --8/4/23- PET scan- Decreasing size and intensity of FDG uptake of the right external auditory canal supporting response to current therapy. No evidence of regional lymphatic or distant metastasis.  --2/1/24- PET scan-No evidence of metastatic disease. Similar-appearing small focus of slightly increased/asymmetric metabolic activity within the anterior right external auditory canal, with activity below liver background level. This low-level activity may be posttreatment inflammatory change but residual active neoplasm is not excluded. Degree of uptake is similar to slightly decreased from the PET/CT performed in August 2023 and markedly decreased from the PET/CT performed January 2023. 10 mm calculus at the left ureterovesical junction causing severe left-sided hydronephrosis.  --8/6/24- PET scan-No evidence of hypermetabolic metastatic disease. Persistent soft tissue thickening in the right external auditory canal and persistent right mastoid effusion. Interval decrease in low-level metabolic activity within this area of soft tissue thickening with max SUV now 2.2 (previously 2.7), which is below physiologic background levels.  --3/20/25- PET scan-  No evidence of FDG-avid leyda or metastatic disease. Previously described soft tissue thickening and mild FDG uptake at  the RIGHT external auditory canal not convincingly redemonstrated on  today's exam. Decreased RIGHT mastoid fluid compared to prior. Interval moderate to severe height loss of T12, not optimally evaluated on this exam.  Correlate with patient symptoms and consider dedicated thoracic spine imaging if clinically indicated.    2.  Renal calculus  --2/20/24- Urology consult-A/P:  Calculus, kidney (Primary)-  KUB completed today no left-sided ureteral stone visualized.  There is a questionable area within the urinary bladder that appears to be within the urethra possibly.  Given this finding recommend further evaluation with CT stone protocol.  Denies any decreased urine output  --2/20/24- CT abdomen/pelvis wo contrast-1. Prior cholecystectomy. 2. Atheromatous disease of the aortoiliac vessels and coronary arteries. Borderline cardiomegaly. 3. A 5.3 cm exophytic cyst arising from the upper pole left kidney. No additional follow-up recommended. There is a 2 mm nonobstructive stone in the upper pole left kidney. There is mild dilatation of the left renal collecting system and proximal left ureter. No visible ureteral stone is identified. 4. A 1.3 cm stone is seen in the urinary bladder. 5. Moderate size hiatal hernia. Diverticulosis of the colon. Normal appendix. 6. Possible leiomyoma in the uterine fundus. 7. Multiple compression deformities. New compression deformity of the superior endplate of L2 is likely subacute in age. There is some sclerotic change of the superior endplate.    3.   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 Beth Richards, [facility] for chronic thrombocytopenia.  Question ITP vs MDS.  Last seen in office by Dr. Richards, 07/08/2017.  At that time platelets have been stable for the past few years without active bleeding.  Platelet count was 23,000,.  To a baseline of 30,000.  No aggressive treatments were pursued.  Previous bone marrow biopsy, 11/05/2012 showed hypercellular marrow showing maturing trilineage hematopoiesis with mild dyserythropoiesis and mild megakaryocytic atypia.  No increase in blasts.    --8/24/23- Peripheral blood smear, pathologist review: Severe  thrombocytopenia.  Mild normocytic anemia. No circulating blasts or schistocytes.  Otherwise within normal limits.   --8/24/23- Platelet Abs- negative  --4/9/25-41,000 (ramon: 23,000 per records)  4.   Mild normocytic anemia. MDS vs anemia of chronic disease  --Stable.  Hgb 11, 4/9/2025 (prior:  Hgb 11.6-11.9)  5.   Diabetes mellitus.  6.   BEATRIZ?  - GFR 46.8, 4/9/25 (prior: 60-82)  7.   Advanced age    Plan:  1.   Apprised of labs, 4/9/25- glucose 126, GFR 46 otherwise normal CMP, stable anemia, stable thrombocytopenia (41,000 -prior ramon: 23,000), normal WBC, ferritin 100 (prior 81), iron 53, fe sat 18 (prior: 20%)    2.    Apprised of the PET scan, 3/20/25 (above).  No new mets.   3.    Reappoint to ENT (missed appointment in November, 2024).    4.    Follow-up ENT, PRATIBHA Warren, 8/23/2024-exam: Right with significant canal stenosis and mild inflammation.  Crusted debris noted to the external canal.  A/P: Follow-up 3 months.  5.   Review progress note from PRATIBHA Dave with Dr. Taylor, 8/9/24 - A/P: Locally advanced high risk basal cell skin cancer of the right ear acoustic meatus obstructing the distal auditory canal. She completed 7000 cGy in 35 fractions to the right ear on 03/06/2023.  PET scan completed on 08/06/2024 revealed no evidence of hypermetabolic metastatic disease. Persistent soft tissue thickening in the right external auditory canal and persistent right mastoid effusion. Interval decrease in low-level metabolic activity within this area of soft tissue thickening with max SUV now 2.2 (previously 2.7), which is below physiologic background levels. I have reviewed the chart and other physicians records. she denies untoward side effects from treatment and without evidence for recurrent or metastatic disease on exam today. She will follow up as needed.     6.   Erivedge (vismodegib).  Potential toxicities again discussed (to include but not limited to: Liver injury, azotemia, hyponatremia,  "hypokalemia, Andres-Gulshan syndrome, toxic epidermal necrolysis, muscle spasms, alopecia, dysgeusia, weight loss, fatigue, nausea, amenorrhea, diarrhea, decreased appetite, constipation, arthralgia, vomiting, loss of taste, elevated CK, azotemia.    7.  Previously discussed that if PET scan does not show metastatic disease and if disease can be encompassed by definitive RT, given patient's advanced age and comorbidities, will consider withholding systemic therapy until she develops more widespread \"measurable disease\".  On the other hand, if PET scan shows metastatic disease, will consider systemic therapy up-front    8.   Continue management per PCP and other specialists- Follow -up ENT as scheduled  9.   Schedule whole body PET scan in 23 weeks  10.   Return to office in 24 weeks with preoffice CMP, iron, fe sat, ferritin, b12, folate and CBC w diff.      I spent ~38 minutes caring for  Rosanna on this date of service. This time includes time spent by me in the following activities: preparing for the visit, reviewing tests, performing a medically appropriate examination and/or evaluation, counseling and educating the patient/family/caregiver, ordering medications, tests, or procedures and documenting information in the medical record.       "

## 2025-04-24 ENCOUNTER — OFFICE VISIT (OUTPATIENT)
Dept: ONCOLOGY | Facility: CLINIC | Age: 85
End: 2025-04-24
Payer: MEDICARE

## 2025-04-24 VITALS
WEIGHT: 144 LBS | OXYGEN SATURATION: 99 % | TEMPERATURE: 97.3 F | HEIGHT: 66 IN | BODY MASS INDEX: 23.14 KG/M2 | SYSTOLIC BLOOD PRESSURE: 96 MMHG | HEART RATE: 81 BPM | DIASTOLIC BLOOD PRESSURE: 62 MMHG | RESPIRATION RATE: 18 BRPM

## 2025-04-24 DIAGNOSIS — C44.212 BASAL CELL CARCINOMA (BCC) OF SKIN OF RIGHT EAR: Primary | ICD-10-CM

## 2025-04-30 ENCOUNTER — TELEPHONE (OUTPATIENT)
Dept: OTOLARYNGOLOGY | Facility: CLINIC | Age: 85
End: 2025-04-30

## 2025-04-30 NOTE — TELEPHONE ENCOUNTER
Caller: KATHE    Relationship to patient: JESSICA    Best call back number: [phone]    Patient is needing: PT CAN NOT MAKE 5/2/25 APPT, HUB HAS NOT AVAIL UNTIL JULY.  PLEASE CALL PT TO R/S WITHIN THE TIMEFRAME REQUIRED  APPT NEEDS TO BE W/TH

## 2025-05-01 ENCOUNTER — TELEPHONE (OUTPATIENT)
Dept: OTOLARYNGOLOGY | Facility: CLINIC | Age: 85
End: 2025-05-01
Payer: MEDICARE